# Patient Record
Sex: MALE | Race: WHITE | NOT HISPANIC OR LATINO | URBAN - METROPOLITAN AREA
[De-identification: names, ages, dates, MRNs, and addresses within clinical notes are randomized per-mention and may not be internally consistent; named-entity substitution may affect disease eponyms.]

---

## 2017-01-02 ENCOUNTER — EMERGENCY (EMERGENCY)
Facility: HOSPITAL | Age: 47
LOS: 1 days | Discharge: HOME | End: 2017-01-02
Attending: EMERGENCY MEDICINE | Admitting: EMERGENCY MEDICINE
Payer: COMMERCIAL

## 2017-01-02 VITALS
TEMPERATURE: 98 F | DIASTOLIC BLOOD PRESSURE: 99 MMHG | RESPIRATION RATE: 18 BRPM | WEIGHT: 265 LBS | SYSTOLIC BLOOD PRESSURE: 153 MMHG | OXYGEN SATURATION: 99 % | HEART RATE: 75 BPM

## 2017-01-02 VITALS
SYSTOLIC BLOOD PRESSURE: 145 MMHG | RESPIRATION RATE: 15 BRPM | OXYGEN SATURATION: 98 % | DIASTOLIC BLOOD PRESSURE: 90 MMHG | HEART RATE: 75 BPM

## 2017-01-02 DIAGNOSIS — S09.90XA UNSPECIFIED INJURY OF HEAD, INITIAL ENCOUNTER: ICD-10-CM
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
## 2017-01-02 DIAGNOSIS — R51 HEADACHE: ICD-10-CM
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
## 2017-01-02 DIAGNOSIS — Z88.0 ALLERGY STATUS TO PENICILLIN: ICD-10-CM

## 2017-01-02 PROCEDURE — 99284 EMERGENCY DEPT VISIT MOD MDM: CPT

## 2017-01-02 PROCEDURE — 70450 CT HEAD/BRAIN W/O DYE: CPT

## 2017-01-02 PROCEDURE — 99284 EMERGENCY DEPT VISIT MOD MDM: CPT | Mod: 25

## 2017-01-02 PROCEDURE — 70450 CT HEAD/BRAIN W/O DYE: CPT | Mod: 26

## 2017-01-02 RX ORDER — AZITHROMYCIN 500 MG/1
1 TABLET, FILM COATED ORAL
Qty: 6 | Refills: 0 | OUTPATIENT
Start: 2017-01-02 | End: 2017-01-07

## 2017-01-02 NOTE — ED ADULT NURSE REASSESSMENT NOTE - NS ED NURSE REASSESS COMMENT FT1
pt reavaluated and d/c  home to follow up s/p ct scan negative
pt stable ct scan done awaiting disposition

## 2017-01-02 NOTE — ED PROVIDER NOTE - OBJECTIVE STATEMENT
45 yo male c/o frontal headache, intermittent x 1 week, hit his head against car door, no LOC, no neck pain, c/o headache that has not gone away since.  Taking tylenol and excedrin without much relief.  No nausea/vomiting.  No photophobia. 45 yo male c/o frontal headache, intermittent x 1 week, hit his head against car door, no LOC, no neck pain, c/o headache that has not gone away since.  Taking tylenol and excedrin without much relief.  No nausea/vomiting.  No photophobia. PMD Dr. Dany Ahn

## 2017-01-02 NOTE — ED ADULT NURSE NOTE - OBJECTIVE STATEMENT
received pt stable ambulating c/o headache on and off s/p hitting forehead in a car door pt evaluated and denies any blurred visiion at present nd taken to ct scan

## 2018-08-02 ENCOUNTER — EMERGENCY (EMERGENCY)
Facility: HOSPITAL | Age: 48
LOS: 1 days | Discharge: HOME | End: 2018-08-02
Attending: INTERNAL MEDICINE
Payer: COMMERCIAL

## 2018-08-02 VITALS
DIASTOLIC BLOOD PRESSURE: 81 MMHG | HEIGHT: 67 IN | WEIGHT: 240.08 LBS | RESPIRATION RATE: 20 BRPM | TEMPERATURE: 98 F | HEART RATE: 74 BPM | SYSTOLIC BLOOD PRESSURE: 150 MMHG | OXYGEN SATURATION: 96 %

## 2018-08-02 PROCEDURE — 99284 EMERGENCY DEPT VISIT MOD MDM: CPT

## 2018-08-02 PROCEDURE — 99283 EMERGENCY DEPT VISIT LOW MDM: CPT

## 2018-08-02 RX ORDER — DIPHENHYDRAMINE HCL 50 MG
1 CAPSULE ORAL
Qty: 20 | Refills: 0 | OUTPATIENT
Start: 2018-08-02 | End: 2018-08-06

## 2018-08-02 RX ADMIN — Medication 100 MILLIGRAM(S): at 22:18

## 2018-08-02 NOTE — ED PROVIDER NOTE - CHPI ED SYMPTOMS NEG
no vomiting/no wheezing/no dizziness/no fever/no red streaks/no tingling/no swelling of face, tongue

## 2018-08-02 NOTE — ED ADULT NURSE NOTE - OBJECTIVE STATEMENT
Present to ER with c/o of right arm swelling s/p bee sting yesterday. Pt states he got stung yesterday and c/o of itching and pain. Site has mild redness that got worse today.

## 2018-08-02 NOTE — ED ADULT NURSE NOTE - NSIMPLEMENTINTERV_GEN_ALL_ED
Implemented All Universal Safety Interventions:  [place] to call system. Call bell, personal items and telephone within reach. Instruct patient to call for assistance. Room bathroom lighting operational. Non-slip footwear when patient is off stretcher. Physically safe environment: no spills, clutter or unnecessary equipment. Stretcher in lowest position, wheels locked, appropriate side rails in place.

## 2018-08-02 NOTE — ED PROVIDER NOTE - ATTENDING CONTRIBUTION TO CARE
47 y/o man with bee sting x 24 h now with a localized cellulitis, no fever, no D/C no streak, no nodes, Exam local cellulitis, no fb noted plan doxycycline, benadryl and warm compress f/u with PMD

## 2018-08-02 NOTE — ED PROVIDER NOTE - OBJECTIVE STATEMENT
47 y/o M presents with c/o R upper arm redness s/p bee sting yesterday. Pt states that he was stung by a bee yesterday morning to his R upper arm, had mild itching and pain. States that he had mild redness around the site of bite but got worse today, took last dose of benadryl around 5:15pm. Denies streaking, fever, chills, n/v, numbness, tingling, myalgias, difficulty breathing, discharge or other symptoms.

## 2018-08-02 NOTE — ED PROVIDER NOTE - SKIN, MLM
Skin normal color for race, warm, dry and intact. +single tiny blister noted to volar R distal humerus with surrounding erythema and inflammation, no streaking/discharge, no fluctuance noted, pulses and sensation intact, NVI

## 2018-08-02 NOTE — ED PROVIDER NOTE - PROGRESS NOTE DETAILS
Pt examined by ED attending, Dr. Parry who agreed with disposition and plan. Will d/c home on benadryl and doxycycline for cellulitis, f/u pmd

## 2018-10-20 RX ADMIN — MORPHINE SULFATE 2 MILLIGRAM(S): 50 CAPSULE, EXTENDED RELEASE ORAL at 23:30

## 2018-10-28 ENCOUNTER — TRANSCRIPTION ENCOUNTER (OUTPATIENT)
Age: 48
End: 2018-10-28

## 2018-10-28 ENCOUNTER — INPATIENT (INPATIENT)
Facility: HOSPITAL | Age: 48
LOS: 2 days | Discharge: HOME | DRG: 419 | End: 2018-10-31
Attending: SURGERY | Admitting: SURGERY
Payer: COMMERCIAL

## 2018-10-28 VITALS
OXYGEN SATURATION: 98 % | HEART RATE: 76 BPM | DIASTOLIC BLOOD PRESSURE: 82 MMHG | TEMPERATURE: 98 F | HEIGHT: 68 IN | RESPIRATION RATE: 15 BRPM | SYSTOLIC BLOOD PRESSURE: 159 MMHG | WEIGHT: 255.96 LBS

## 2018-10-28 DIAGNOSIS — R10.9 UNSPECIFIED ABDOMINAL PAIN: ICD-10-CM

## 2018-10-28 DIAGNOSIS — K81.0 ACUTE CHOLECYSTITIS: ICD-10-CM

## 2018-10-28 LAB
ALBUMIN SERPL ELPH-MCNC: 3.3 G/DL — SIGNIFICANT CHANGE UP (ref 3.3–5)
ALP SERPL-CCNC: 105 U/L — SIGNIFICANT CHANGE UP (ref 40–120)
ALT FLD-CCNC: 33 U/L — SIGNIFICANT CHANGE UP (ref 12–78)
AMYLASE P1 CFR SERPL: 41 U/L — SIGNIFICANT CHANGE UP (ref 25–115)
ANION GAP SERPL CALC-SCNC: 5 MMOL/L — SIGNIFICANT CHANGE UP (ref 5–17)
APPEARANCE UR: CLEAR — SIGNIFICANT CHANGE UP
AST SERPL-CCNC: 23 U/L — SIGNIFICANT CHANGE UP (ref 15–37)
BILIRUB SERPL-MCNC: 0.9 MG/DL — SIGNIFICANT CHANGE UP (ref 0.2–1.2)
BILIRUB UR-MCNC: NEGATIVE — SIGNIFICANT CHANGE UP
BUN SERPL-MCNC: 9 MG/DL — SIGNIFICANT CHANGE UP (ref 7–23)
CALCIUM SERPL-MCNC: 8.6 MG/DL — SIGNIFICANT CHANGE UP (ref 8.5–10.1)
CHLORIDE SERPL-SCNC: 106 MMOL/L — SIGNIFICANT CHANGE UP (ref 96–108)
CO2 SERPL-SCNC: 30 MMOL/L — SIGNIFICANT CHANGE UP (ref 22–31)
COLOR SPEC: SIGNIFICANT CHANGE UP
CREAT SERPL-MCNC: 0.88 MG/DL — SIGNIFICANT CHANGE UP (ref 0.5–1.3)
DIFF PNL FLD: NEGATIVE — SIGNIFICANT CHANGE UP
GLUCOSE SERPL-MCNC: 116 MG/DL — HIGH (ref 70–99)
GLUCOSE UR QL: NEGATIVE — SIGNIFICANT CHANGE UP
HCT VFR BLD CALC: 44.7 % — SIGNIFICANT CHANGE UP (ref 39–50)
HGB BLD-MCNC: 15.3 G/DL — SIGNIFICANT CHANGE UP (ref 13–17)
KETONES UR-MCNC: NEGATIVE — SIGNIFICANT CHANGE UP
LEUKOCYTE ESTERASE UR-ACNC: NEGATIVE — SIGNIFICANT CHANGE UP
LIDOCAIN IGE QN: 232 U/L — SIGNIFICANT CHANGE UP (ref 73–393)
MCHC RBC-ENTMCNC: 28.2 PG — SIGNIFICANT CHANGE UP (ref 27–34)
MCHC RBC-ENTMCNC: 34.2 GM/DL — SIGNIFICANT CHANGE UP (ref 32–36)
MCV RBC AUTO: 82.5 FL — SIGNIFICANT CHANGE UP (ref 80–100)
NITRITE UR-MCNC: NEGATIVE — SIGNIFICANT CHANGE UP
NRBC # BLD: 0 /100 WBCS — SIGNIFICANT CHANGE UP (ref 0–0)
PH UR: 7 — SIGNIFICANT CHANGE UP (ref 5–8)
PLATELET # BLD AUTO: 205 K/UL — SIGNIFICANT CHANGE UP (ref 150–400)
POTASSIUM SERPL-MCNC: 3.9 MMOL/L — SIGNIFICANT CHANGE UP (ref 3.5–5.3)
POTASSIUM SERPL-SCNC: 3.9 MMOL/L — SIGNIFICANT CHANGE UP (ref 3.5–5.3)
PROT SERPL-MCNC: 7.2 G/DL — SIGNIFICANT CHANGE UP (ref 6–8.3)
PROT UR-MCNC: NEGATIVE — SIGNIFICANT CHANGE UP
RBC # BLD: 5.42 M/UL — SIGNIFICANT CHANGE UP (ref 4.2–5.8)
RBC # FLD: 12.9 % — SIGNIFICANT CHANGE UP (ref 10.3–14.5)
SODIUM SERPL-SCNC: 141 MMOL/L — SIGNIFICANT CHANGE UP (ref 135–145)
SP GR SPEC: 1 — LOW (ref 1.01–1.02)
UROBILINOGEN FLD QL: NEGATIVE — SIGNIFICANT CHANGE UP
WBC # BLD: 5.14 K/UL — SIGNIFICANT CHANGE UP (ref 3.8–10.5)
WBC # FLD AUTO: 5.14 K/UL — SIGNIFICANT CHANGE UP (ref 3.8–10.5)

## 2018-10-28 PROCEDURE — 93010 ELECTROCARDIOGRAM REPORT: CPT | Mod: 76

## 2018-10-28 PROCEDURE — 76705 ECHO EXAM OF ABDOMEN: CPT | Mod: 26

## 2018-10-28 PROCEDURE — 71045 X-RAY EXAM CHEST 1 VIEW: CPT | Mod: 26

## 2018-10-28 PROCEDURE — 78226 HEPATOBILIARY SYSTEM IMAGING: CPT | Mod: 26

## 2018-10-28 PROCEDURE — 99284 EMERGENCY DEPT VISIT MOD MDM: CPT

## 2018-10-28 RX ORDER — MORPHINE SULFATE 50 MG/1
2 CAPSULE, EXTENDED RELEASE ORAL ONCE
Qty: 0 | Refills: 0 | Status: DISCONTINUED | OUTPATIENT
Start: 2018-10-28 | End: 2018-10-28

## 2018-10-28 RX ORDER — CIPROFLOXACIN LACTATE 400MG/40ML
VIAL (ML) INTRAVENOUS
Qty: 0 | Refills: 0 | Status: DISCONTINUED | OUTPATIENT
Start: 2018-10-28 | End: 2018-10-29

## 2018-10-28 RX ORDER — PANTOPRAZOLE SODIUM 20 MG/1
40 TABLET, DELAYED RELEASE ORAL ONCE
Qty: 0 | Refills: 0 | Status: COMPLETED | OUTPATIENT
Start: 2018-10-28 | End: 2018-10-28

## 2018-10-28 RX ORDER — METRONIDAZOLE 500 MG
TABLET ORAL
Qty: 0 | Refills: 0 | Status: DISCONTINUED | OUTPATIENT
Start: 2018-10-28 | End: 2018-10-29

## 2018-10-28 RX ORDER — INFLUENZA VIRUS VACCINE 15; 15; 15; 15 UG/.5ML; UG/.5ML; UG/.5ML; UG/.5ML
0.5 SUSPENSION INTRAMUSCULAR ONCE
Qty: 0 | Refills: 0 | Status: DISCONTINUED | OUTPATIENT
Start: 2018-10-28 | End: 2018-10-31

## 2018-10-28 RX ORDER — SODIUM CHLORIDE 9 MG/ML
1000 INJECTION INTRAMUSCULAR; INTRAVENOUS; SUBCUTANEOUS ONCE
Qty: 0 | Refills: 0 | Status: COMPLETED | OUTPATIENT
Start: 2018-10-28 | End: 2018-10-28

## 2018-10-28 RX ORDER — CEFTRIAXONE 500 MG/1
1 INJECTION, POWDER, FOR SOLUTION INTRAMUSCULAR; INTRAVENOUS ONCE
Qty: 0 | Refills: 0 | Status: COMPLETED | OUTPATIENT
Start: 2018-10-28 | End: 2018-10-28

## 2018-10-28 RX ORDER — SODIUM CHLORIDE 9 MG/ML
1000 INJECTION, SOLUTION INTRAVENOUS
Qty: 0 | Refills: 0 | Status: DISCONTINUED | OUTPATIENT
Start: 2018-10-28 | End: 2018-10-29

## 2018-10-28 RX ORDER — MORPHINE SULFATE 50 MG/1
2 CAPSULE, EXTENDED RELEASE ORAL EVERY 4 HOURS
Qty: 0 | Refills: 0 | Status: DISCONTINUED | OUTPATIENT
Start: 2018-10-28 | End: 2018-10-29

## 2018-10-28 RX ORDER — METRONIDAZOLE 500 MG
500 TABLET ORAL ONCE
Qty: 0 | Refills: 0 | Status: COMPLETED | OUTPATIENT
Start: 2018-10-28 | End: 2018-10-28

## 2018-10-28 RX ORDER — METRONIDAZOLE 500 MG
500 TABLET ORAL EVERY 8 HOURS
Qty: 0 | Refills: 0 | Status: DISCONTINUED | OUTPATIENT
Start: 2018-10-28 | End: 2018-10-29

## 2018-10-28 RX ORDER — CIPROFLOXACIN LACTATE 400MG/40ML
400 VIAL (ML) INTRAVENOUS ONCE
Qty: 0 | Refills: 0 | Status: COMPLETED | OUTPATIENT
Start: 2018-10-28 | End: 2018-10-28

## 2018-10-28 RX ORDER — CIPROFLOXACIN LACTATE 400MG/40ML
400 VIAL (ML) INTRAVENOUS EVERY 12 HOURS
Qty: 0 | Refills: 0 | Status: DISCONTINUED | OUTPATIENT
Start: 2018-10-29 | End: 2018-10-29

## 2018-10-28 RX ADMIN — MORPHINE SULFATE 2 MILLIGRAM(S): 50 CAPSULE, EXTENDED RELEASE ORAL at 12:46

## 2018-10-28 RX ADMIN — CEFTRIAXONE 100 GRAM(S): 500 INJECTION, POWDER, FOR SOLUTION INTRAMUSCULAR; INTRAVENOUS at 09:57

## 2018-10-28 RX ADMIN — SODIUM CHLORIDE 75 MILLILITER(S): 9 INJECTION, SOLUTION INTRAVENOUS at 19:54

## 2018-10-28 RX ADMIN — PANTOPRAZOLE SODIUM 40 MILLIGRAM(S): 20 TABLET, DELAYED RELEASE ORAL at 06:48

## 2018-10-28 RX ADMIN — CEFTRIAXONE 1 GRAM(S): 500 INJECTION, POWDER, FOR SOLUTION INTRAMUSCULAR; INTRAVENOUS at 09:47

## 2018-10-28 RX ADMIN — MORPHINE SULFATE 2 MILLIGRAM(S): 50 CAPSULE, EXTENDED RELEASE ORAL at 22:40

## 2018-10-28 RX ADMIN — MORPHINE SULFATE 2 MILLIGRAM(S): 50 CAPSULE, EXTENDED RELEASE ORAL at 19:47

## 2018-10-28 RX ADMIN — SODIUM CHLORIDE 1000 MILLILITER(S): 9 INJECTION INTRAMUSCULAR; INTRAVENOUS; SUBCUTANEOUS at 07:47

## 2018-10-28 RX ADMIN — SODIUM CHLORIDE 1000 MILLILITER(S): 9 INJECTION INTRAMUSCULAR; INTRAVENOUS; SUBCUTANEOUS at 15:00

## 2018-10-28 RX ADMIN — Medication 200 MILLIGRAM(S): at 15:54

## 2018-10-28 RX ADMIN — MORPHINE SULFATE 2 MILLIGRAM(S): 50 CAPSULE, EXTENDED RELEASE ORAL at 14:55

## 2018-10-28 RX ADMIN — SODIUM CHLORIDE 1000 MILLILITER(S): 9 INJECTION INTRAMUSCULAR; INTRAVENOUS; SUBCUTANEOUS at 06:48

## 2018-10-28 RX ADMIN — MORPHINE SULFATE 2 MILLIGRAM(S): 50 CAPSULE, EXTENDED RELEASE ORAL at 17:04

## 2018-10-28 RX ADMIN — Medication 100 MILLIGRAM(S): at 18:25

## 2018-10-28 NOTE — ED PROVIDER NOTE - OBJECTIVE STATEMENT
47 y/o WM no PMH c/o upper abdominal pain with N/V,  intermittent episodes of pain during the past week following meals. No CP, no SOB, no fever, no chills no urinary symptoms, no GIB

## 2018-10-28 NOTE — ED ADULT NURSE NOTE - OBJECTIVE STATEMENT
Received patient awake and alert x 4, presenting to the ED with epigastric pain along with N/V x 1 week. Patient states pain has been on and off following meals. Patient denies any CP/SOB, no fever/chills. Breathing unlabored with no acute distress noted at this time. safety and comfort measures maintained, will continue to monitor.

## 2018-10-28 NOTE — H&P ADULT - NSHPLABSRESULTS_GEN_ALL_CORE
15.3   5.14  )-----------( 205      ( 28 Oct 2018 06:36 )             44.7   10-28    141  |  106  |  9   ----------------------------<  116<H>  3.9   |  30  |  0.88    Ca    8.6      28 Oct 2018 06:36    TPro  7.2  /  Alb  3.3  /  TBili  0.9  /  DBili  x   /  AST  23  /  ALT  33  /  AlkPhos  105  10-28  < from: NM Hepatobiliary Scan w/wo Gall Bladder (10.28.18 @ 13:56) >    NDINGS: There is prompt, homogeneous uptake of radiopharmaceutical by   the hepatocytes. Activity is first seen in the bowel at about 20 minutes.   The gallbladder is not visualized at any time during the study, despite   morphine administration. There is good clearance of activity from the   liver at the end of the study.    IMPRESSION: Abnormal morphine-augmented hepatobiliary scan: acute   cholecystitis.    < end of copied text >

## 2018-10-28 NOTE — ED PROVIDER NOTE - PROGRESS NOTE DETAILS
Dr. Ann:  Pt signed out to me pending RUQ sono for epigastric and RUQ pain. C/o chest pain, ekg was done and WNL. Trop added. Dr. Ann:  Case d/w Dr Rizo, GI, rec IV abx and HIDA. Will see pt. Dr. Ann: Pt at [facility], unable to do test without IV pain meds. Pt ordered for Morphine, tech aware of concern over causing abnormal test result. Dr. Ann: Positive HIDA for acute kaylene, pt sp IV abx, TBA Dr ePdersen.

## 2018-10-28 NOTE — H&P ADULT - NSHPPHYSICALEXAM_GEN_ALL_CORE
.  VITAL SIGNS:  T(C): 36.8 (10-28-18 @ 15:02), Max: 36.8 (10-28-18 @ 15:02)  T(F): 98.3 (10-28-18 @ 15:02), Max: 98.3 (10-28-18 @ 15:02)  HR: 74 (10-28-18 @ 15:02) (74 - 78)  BP: 152/68 (10-28-18 @ 15:02) (152/68 - 160/69)  BP(mean): --  RR: 17 (10-28-18 @ 15:02) (15 - 19)  SpO2: 100% (10-28-18 @ 15:02) (98% - 100%)  Wt(kg): --    PHYSICAL EXAM:    Constitutional:  NAD, resting comfortably in bed  Head: NC/AT  Eyes: PERRL b/l  ENT: MMM  Neck: supple; no JVD or thyromegaly  Respiratory: CTA B/L   Cardiac: +S1/S2; RRR   Gastrointestinal: soft, ND, RUQ tenderness  Back: no CVA B/L  Extremities: WWP, no clubbing or cyanosis; no peripheral edema  Musculoskeletal: NROM x4;   Vascular: 2+ radial, femoral, DP/PT pulses B/L  Dermatologic: skin warm, dry and intact; no rashes, wounds, or scars  Lymphatic: no submandibular, cervical or  LAD  Neurologic: AAOx3; CNII-XII grossly intact; no focal deficits  Psychiatric: affect and characteristics of appearance, verbalizations, behaviors are appropriate

## 2018-10-28 NOTE — CONSULT NOTE ADULT - ASSESSMENT
49 y/o WM no PMH c/o upper abdominal pain with N/V,  intermittent episodes of pain during the past week following meals. gi consulted for abd pain 47 y/o WM no PMH c/o upper abdominal pain with N/V,  intermittent episodes of pain during the past week following meals. gi consulted for abd pain, possible acute kaylene

## 2018-10-28 NOTE — ED ADULT NURSE REASSESSMENT NOTE - NS ED NURSE REASSESS COMMENT FT1
Received report from Rosa SHARP pt mentating at baseline. no change in neuro status. pt denies pain at this time. pt placed in position of comfort, given warm blankets. safety maintained. will continue to monitor.

## 2018-10-28 NOTE — H&P ADULT - HISTORY OF PRESENT ILLNESS
PT is a 47 yo male with 1 week hx of abdominal pain and vomiting. PT states on 3 episodes of severe epigastric pain with vomiting. LAst episode for 12 hrs straight.  Denies any fever.chills. No previous episodes before 1 week ago,.

## 2018-10-28 NOTE — CONSULT NOTE ADULT - PROBLEM SELECTOR RECOMMENDATION 9
US noted  check hida  surgical eval  abx if acute kaylene  ppi qd, zofran prn  npo/ivf  monitor abd pain  pain control US noted  f/u hida scan  case dw surgery, will see if hida +  abx if acute kaylene  ppi qd, zofran prn  npo/ivf  monitor abd exam  pain control  dw ed attg  may need egd  will follow

## 2018-10-28 NOTE — CONSULT NOTE ADULT - SUBJECTIVE AND OBJECTIVE BOX
Chief Complaint:  Patient is a 48y old  Male who presents with a chief complaint of   No pertinent past medical history      HPI: 47 y/o WM no PMH c/o upper abdominal pain with N/V,  intermittent episodes of pain during the past week following meals. No CP, no SOB, no fever, no chills no urinary symptoms, no GIB    gi consulted for ruq pain. chart reviewed. pt seen and examined      recent vs/labs/imaging reviewed   afebrile no leukocytosis  lfts wnl  us showed gs w mild wall thickening and possible fluid  hida pending      penicillin (Hives)      morphine  - Injectable 2 milliGRAM(s) IV Push Once        FAMILY HISTORY:        Review of Systems:    General:  No wt loss, fevers, chills, night sweats, fatigue  Eyes:  Good vision, no reported pain  ENT:  No sore throat, pain, runny nose, dysphagia  CV:  No pain, palpitations, no lightheadedness  Resp:  No dyspnea, cough, tachypnea, wheezing  GI: see above  :  No pain, bleeding, incontinence, nocturia  Muscle:  No pain, weakness  Neuro:  No weakness, tingling, memory problems  Psych:  No fatigue, insomnia, mood problems, depression  Endocrine:  No polyuria, polydypsia, cold/heat intolerance  Heme:  No petechiae, ecchymosis, easy bruisability  Skin:  No rash, tattoos, scars, edema    Relevant Family History:   n/c    Relevant Social History: n/c      Physical Exam:    Vital Signs:  Vital Signs Last 24 Hrs  T(C): 36.4 (28 Oct 2018 07:53), Max: 36.4 (28 Oct 2018 06:01)  T(F): 97.6 (28 Oct 2018 07:53), Max: 97.6 (28 Oct 2018 06:01)  HR: 75 (28 Oct 2018 07:53) (75 - 76)  BP: 160/68 (28 Oct 2018 07:53) (159/82 - 160/68)  BP(mean): --  RR: 18 (28 Oct 2018 07:53) (15 - 18)  SpO2: 99% (28 Oct 2018 07:53) (98% - 99%)  Daily Height in cm: 172.72 (28 Oct 2018 06:01)    Daily     General:  Appears stated age, well-groomed, nad  HEENT:  NC/AT,  conjunctivae clear and pink, no thyromegaly, nodules, adenopathy, no JVD  Chest:  Full & symmetric excursion, no increased effort, breath sounds clear  Cardiovascular:  Regular rhythm, S1, S2, no murmur/rub/S3/S4, no abdominal bruit, no edema  Abdomen:  Soft, non-tender, non-distended, normoactive bowel sounds,  no masses ,no hepatosplenomeagaly, no signs of chronic liver disease  Extremities:  no cyanosis,clubbing or edema  Skin:  No rash/erythema/ecchymoses/petechiae/wounds/abscess/warm/dry  Neuro/Psych:  A&O  , no asterixis, no tremor, no encephalopathy    Laboratory:                            15.3   5.14  )-----------( 205      ( 28 Oct 2018 06:36 )             44.7     10-28    141  |  106  |  9   ----------------------------<  116<H>  3.9   |  30  |  0.88    Ca    8.6      28 Oct 2018 06:36    TPro  7.2  /  Alb  3.3  /  TBili  0.9  /  DBili  x   /  AST  23  /  ALT  33  /  AlkPhos  105  10-28    LIVER FUNCTIONS - ( 28 Oct 2018 06:36 )  Alb: 3.3 g/dL / Pro: 7.2 g/dL / ALK PHOS: 105 U/L / ALT: 33 U/L / AST: 23 U/L / GGT: x               Amylase Serum41      Lipase [id number]       Ammonia--    Imaging: Chief Complaint:  Patient is a 48y old  Male who presents with a chief complaint of   No pertinent past medical history      HPI: 49 y/o WM no PMH c/o upper abdominal pain with N/V,  intermittent episodes of pain during the past week following meals. No CP, no SOB, no fever, no chills no urinary symptoms, no GIB    gi consulted for ruq pain. chart reviewed. pt seen and examined. wife present. pt reports intermittent nausea, non bloody vomiting and non radiating epigastric/ruq pain over the past week, symptoms waxing and waning but worse post prandially. denies hx of pud, pancreatitis. last bm yesterday and normal. reports similar pain in remote past. +chills at home. denies fevers/melena/brbpr.  denies prior abd surgeries. no hx of egd colon. no fhx of gi cancers. meds n/c. denies toxic habits.    recent vs/labs/imaging reviewed   afebrile no leukocytosis  lfts wnl  us showed gs w mild wall thickening and possible fluid  hida pending- unable to tolerate initially, re-medicated, study to be reattempted      penicillin (Hives)      morphine  - Injectable 2 milliGRAM(s) IV Push Once        FAMILY HISTORY:        Review of Systems:    General:  No wt loss, fevers, chills, night sweats, fatigue  Eyes:  Good vision, no reported pain  ENT:  No sore throat, pain, runny nose, dysphagia  CV:  No pain, palpitations, no lightheadedness  Resp:  No dyspnea, cough, tachypnea, wheezing  GI: see above  :  No pain, bleeding, incontinence, nocturia  Muscle:  No pain, weakness  Neuro:  No weakness, tingling, memory problems  Psych:  No fatigue, insomnia, mood problems, depression  Endocrine:  No polyuria, polydypsia, cold/heat intolerance  Heme:  No petechiae, ecchymosis, easy bruisability  Skin:  No rash, tattoos, scars, edema    Relevant Family History:   n/c    Relevant Social History: n/c      Physical Exam:    Vital Signs:  Vital Signs Last 24 Hrs  T(C): 36.4 (28 Oct 2018 07:53), Max: 36.4 (28 Oct 2018 06:01)  T(F): 97.6 (28 Oct 2018 07:53), Max: 97.6 (28 Oct 2018 06:01)  HR: 75 (28 Oct 2018 07:53) (75 - 76)  BP: 160/68 (28 Oct 2018 07:53) (159/82 - 160/68)  BP(mean): --  RR: 18 (28 Oct 2018 07:53) (15 - 18)  SpO2: 99% (28 Oct 2018 07:53) (98% - 99%)  Daily Height in cm: 172.72 (28 Oct 2018 06:01)    Daily     General:  lying in bed in nad  HEENT:  NC/AT  Chest:  dec bs  Cardiovascular:  Regular rhythm, S1, S2  Abdomen:  soft ttp epigastric and ruq no guarding no rt- but pt recently medicated, nd  Extremities:  no  edema  Skin:  No rash  Neuro/Psych:  A&Ox3    Laboratory:                            15.3   5.14  )-----------( 205      ( 28 Oct 2018 06:36 )             44.7     10-28    141  |  106  |  9   ----------------------------<  116<H>  3.9   |  30  |  0.88    Ca    8.6      28 Oct 2018 06:36    TPro  7.2  /  Alb  3.3  /  TBili  0.9  /  DBili  x   /  AST  23  /  ALT  33  /  AlkPhos  105  10-28    LIVER FUNCTIONS - ( 28 Oct 2018 06:36 )  Alb: 3.3 g/dL / Pro: 7.2 g/dL / ALK PHOS: 105 U/L / ALT: 33 U/L / AST: 23 U/L / GGT: x               Amylase Serum41      Lipase [id number]       Ammonia--    Imaging:

## 2018-10-28 NOTE — ED ADULT NURSE REASSESSMENT NOTE - NS ED NURSE REASSESS COMMENT FT1
Patient received from [facility] RN lying in bed informed of the plan of care with understanding.

## 2018-10-28 NOTE — ED PROVIDER NOTE - SIGNIFICANT NEGATIVE FINDINGS
no headache, no chest pain, no SOB, no palpitations , no urinary symptoms, no GI  bleeding. no neuro changes.

## 2018-10-29 ENCOUNTER — RESULT REVIEW (OUTPATIENT)
Age: 48
End: 2018-10-29

## 2018-10-29 DIAGNOSIS — K81.0 ACUTE CHOLECYSTITIS: ICD-10-CM

## 2018-10-29 PROCEDURE — 88304 TISSUE EXAM BY PATHOLOGIST: CPT | Mod: 26

## 2018-10-29 RX ORDER — METRONIDAZOLE 500 MG
500 TABLET ORAL EVERY 8 HOURS
Qty: 0 | Refills: 0 | Status: DISCONTINUED | OUTPATIENT
Start: 2018-10-29 | End: 2018-10-31

## 2018-10-29 RX ORDER — KETOROLAC TROMETHAMINE 30 MG/ML
30 SYRINGE (ML) INJECTION ONCE
Qty: 0 | Refills: 0 | Status: DISCONTINUED | OUTPATIENT
Start: 2018-10-29 | End: 2018-10-29

## 2018-10-29 RX ORDER — ONDANSETRON 8 MG/1
4 TABLET, FILM COATED ORAL ONCE
Qty: 0 | Refills: 0 | Status: DISCONTINUED | OUTPATIENT
Start: 2018-10-29 | End: 2018-10-29

## 2018-10-29 RX ORDER — METOCLOPRAMIDE HCL 10 MG
10 TABLET ORAL ONCE
Qty: 0 | Refills: 0 | Status: DISCONTINUED | OUTPATIENT
Start: 2018-10-29 | End: 2018-10-29

## 2018-10-29 RX ORDER — OXYCODONE AND ACETAMINOPHEN 5; 325 MG/1; MG/1
2 TABLET ORAL EVERY 4 HOURS
Qty: 0 | Refills: 0 | Status: DISCONTINUED | OUTPATIENT
Start: 2018-10-29 | End: 2018-10-31

## 2018-10-29 RX ORDER — ACETAMINOPHEN 500 MG
1000 TABLET ORAL ONCE
Qty: 0 | Refills: 0 | Status: COMPLETED | OUTPATIENT
Start: 2018-10-29 | End: 2018-10-29

## 2018-10-29 RX ORDER — SODIUM CHLORIDE 9 MG/ML
1000 INJECTION, SOLUTION INTRAVENOUS
Qty: 0 | Refills: 0 | Status: DISCONTINUED | OUTPATIENT
Start: 2018-10-29 | End: 2018-10-29

## 2018-10-29 RX ORDER — SODIUM CHLORIDE 9 MG/ML
1000 INJECTION, SOLUTION INTRAVENOUS
Qty: 0 | Refills: 0 | Status: DISCONTINUED | OUTPATIENT
Start: 2018-10-29 | End: 2018-10-30

## 2018-10-29 RX ORDER — MORPHINE SULFATE 50 MG/1
4 CAPSULE, EXTENDED RELEASE ORAL EVERY 4 HOURS
Qty: 0 | Refills: 0 | Status: DISCONTINUED | OUTPATIENT
Start: 2018-10-29 | End: 2018-10-31

## 2018-10-29 RX ORDER — CIPROFLOXACIN LACTATE 400MG/40ML
400 VIAL (ML) INTRAVENOUS EVERY 12 HOURS
Qty: 0 | Refills: 0 | Status: DISCONTINUED | OUTPATIENT
Start: 2018-10-29 | End: 2018-10-31

## 2018-10-29 RX ORDER — ONDANSETRON 8 MG/1
4 TABLET, FILM COATED ORAL EVERY 6 HOURS
Qty: 0 | Refills: 0 | Status: DISCONTINUED | OUTPATIENT
Start: 2018-10-29 | End: 2018-10-31

## 2018-10-29 RX ORDER — ENOXAPARIN SODIUM 100 MG/ML
40 INJECTION SUBCUTANEOUS EVERY 24 HOURS
Qty: 0 | Refills: 0 | Status: DISCONTINUED | OUTPATIENT
Start: 2018-10-30 | End: 2018-10-31

## 2018-10-29 RX ORDER — OXYCODONE AND ACETAMINOPHEN 5; 325 MG/1; MG/1
1 TABLET ORAL EVERY 4 HOURS
Qty: 0 | Refills: 0 | Status: DISCONTINUED | OUTPATIENT
Start: 2018-10-29 | End: 2018-10-31

## 2018-10-29 RX ORDER — DIPHENHYDRAMINE HCL 50 MG
12.5 CAPSULE ORAL EVERY 4 HOURS
Qty: 0 | Refills: 0 | Status: DISCONTINUED | OUTPATIENT
Start: 2018-10-29 | End: 2018-10-31

## 2018-10-29 RX ADMIN — Medication 200 MILLIGRAM(S): at 00:03

## 2018-10-29 RX ADMIN — OXYCODONE AND ACETAMINOPHEN 2 TABLET(S): 5; 325 TABLET ORAL at 21:00

## 2018-10-29 RX ADMIN — MORPHINE SULFATE 2 MILLIGRAM(S): 50 CAPSULE, EXTENDED RELEASE ORAL at 03:23

## 2018-10-29 RX ADMIN — SODIUM CHLORIDE 75 MILLILITER(S): 9 INJECTION, SOLUTION INTRAVENOUS at 16:58

## 2018-10-29 RX ADMIN — Medication 100 MILLIGRAM(S): at 02:24

## 2018-10-29 RX ADMIN — OXYCODONE AND ACETAMINOPHEN 2 TABLET(S): 5; 325 TABLET ORAL at 20:28

## 2018-10-29 RX ADMIN — MORPHINE SULFATE 2 MILLIGRAM(S): 50 CAPSULE, EXTENDED RELEASE ORAL at 04:30

## 2018-10-29 RX ADMIN — MORPHINE SULFATE 2 MILLIGRAM(S): 50 CAPSULE, EXTENDED RELEASE ORAL at 09:03

## 2018-10-29 RX ADMIN — Medication 100 MILLIGRAM(S): at 10:59

## 2018-10-29 RX ADMIN — Medication 100 MILLIGRAM(S): at 21:39

## 2018-10-29 RX ADMIN — Medication 400 MILLIGRAM(S): at 16:57

## 2018-10-29 RX ADMIN — SODIUM CHLORIDE 75 MILLILITER(S): 9 INJECTION, SOLUTION INTRAVENOUS at 13:44

## 2018-10-29 RX ADMIN — MORPHINE SULFATE 2 MILLIGRAM(S): 50 CAPSULE, EXTENDED RELEASE ORAL at 09:20

## 2018-10-29 NOTE — BRIEF OPERATIVE NOTE - OPERATION/FINDINGS
Marked pericholecystic edema and fatty deposition.  Thickened gallbladder wall. Marked pericholecystic edema and fatty deposition.  Thickened gallbladder wall.  Stones within gallbladder.

## 2018-10-29 NOTE — BRIEF OPERATIVE NOTE - PROCEDURE
<<-----Click on this checkbox to enter Procedure Laparoscopic cholecystectomy  10/29/2018    Active  IVONNE

## 2018-10-30 LAB
ALBUMIN SERPL ELPH-MCNC: 2.9 G/DL — LOW (ref 3.3–5)
ALP SERPL-CCNC: 69 U/L — SIGNIFICANT CHANGE UP (ref 40–120)
ALT FLD-CCNC: 68 U/L — SIGNIFICANT CHANGE UP (ref 12–78)
ANION GAP SERPL CALC-SCNC: 7 MMOL/L — SIGNIFICANT CHANGE UP (ref 5–17)
AST SERPL-CCNC: 68 U/L — HIGH (ref 15–37)
BASOPHILS # BLD AUTO: 0.03 K/UL — SIGNIFICANT CHANGE UP (ref 0–0.2)
BASOPHILS NFR BLD AUTO: 0.3 % — SIGNIFICANT CHANGE UP (ref 0–2)
BILIRUB SERPL-MCNC: 1.2 MG/DL — SIGNIFICANT CHANGE UP (ref 0.2–1.2)
BUN SERPL-MCNC: 9 MG/DL — SIGNIFICANT CHANGE UP (ref 7–23)
CALCIUM SERPL-MCNC: 8.7 MG/DL — SIGNIFICANT CHANGE UP (ref 8.5–10.1)
CHLORIDE SERPL-SCNC: 105 MMOL/L — SIGNIFICANT CHANGE UP (ref 96–108)
CO2 SERPL-SCNC: 29 MMOL/L — SIGNIFICANT CHANGE UP (ref 22–31)
CREAT SERPL-MCNC: 0.79 MG/DL — SIGNIFICANT CHANGE UP (ref 0.5–1.3)
EOSINOPHIL # BLD AUTO: 0.02 K/UL — SIGNIFICANT CHANGE UP (ref 0–0.5)
EOSINOPHIL NFR BLD AUTO: 0.2 % — SIGNIFICANT CHANGE UP (ref 0–6)
GLUCOSE SERPL-MCNC: 104 MG/DL — HIGH (ref 70–99)
HCT VFR BLD CALC: 45.1 % — SIGNIFICANT CHANGE UP (ref 39–50)
HGB BLD-MCNC: 15.1 G/DL — SIGNIFICANT CHANGE UP (ref 13–17)
IMM GRANULOCYTES NFR BLD AUTO: 0.8 % — SIGNIFICANT CHANGE UP (ref 0–1.5)
LYMPHOCYTES # BLD AUTO: 1.89 K/UL — SIGNIFICANT CHANGE UP (ref 1–3.3)
LYMPHOCYTES # BLD AUTO: 15.9 % — SIGNIFICANT CHANGE UP (ref 13–44)
MCHC RBC-ENTMCNC: 27.7 PG — SIGNIFICANT CHANGE UP (ref 27–34)
MCHC RBC-ENTMCNC: 33.5 GM/DL — SIGNIFICANT CHANGE UP (ref 32–36)
MCV RBC AUTO: 82.6 FL — SIGNIFICANT CHANGE UP (ref 80–100)
MONOCYTES # BLD AUTO: 1.49 K/UL — HIGH (ref 0–0.9)
MONOCYTES NFR BLD AUTO: 12.5 % — SIGNIFICANT CHANGE UP (ref 2–14)
NEUTROPHILS # BLD AUTO: 8.39 K/UL — HIGH (ref 1.8–7.4)
NEUTROPHILS NFR BLD AUTO: 70.3 % — SIGNIFICANT CHANGE UP (ref 43–77)
PLATELET # BLD AUTO: 246 K/UL — SIGNIFICANT CHANGE UP (ref 150–400)
POTASSIUM SERPL-MCNC: 3.8 MMOL/L — SIGNIFICANT CHANGE UP (ref 3.5–5.3)
POTASSIUM SERPL-SCNC: 3.8 MMOL/L — SIGNIFICANT CHANGE UP (ref 3.5–5.3)
PROT SERPL-MCNC: 6.9 G/DL — SIGNIFICANT CHANGE UP (ref 6–8.3)
RBC # BLD: 5.46 M/UL — SIGNIFICANT CHANGE UP (ref 4.2–5.8)
RBC # FLD: 13 % — SIGNIFICANT CHANGE UP (ref 10.3–14.5)
SODIUM SERPL-SCNC: 141 MMOL/L — SIGNIFICANT CHANGE UP (ref 135–145)
WBC # BLD: 11.92 K/UL — HIGH (ref 3.8–10.5)
WBC # FLD AUTO: 11.92 K/UL — HIGH (ref 3.8–10.5)

## 2018-10-30 RX ADMIN — ENOXAPARIN SODIUM 40 MILLIGRAM(S): 100 INJECTION SUBCUTANEOUS at 12:07

## 2018-10-30 RX ADMIN — OXYCODONE AND ACETAMINOPHEN 2 TABLET(S): 5; 325 TABLET ORAL at 15:21

## 2018-10-30 RX ADMIN — Medication 200 MILLIGRAM(S): at 12:07

## 2018-10-30 RX ADMIN — Medication 200 MILLIGRAM(S): at 23:56

## 2018-10-30 RX ADMIN — OXYCODONE AND ACETAMINOPHEN 2 TABLET(S): 5; 325 TABLET ORAL at 05:18

## 2018-10-30 RX ADMIN — Medication 100 MILLIGRAM(S): at 20:50

## 2018-10-30 RX ADMIN — Medication 100 MILLIGRAM(S): at 06:18

## 2018-10-30 RX ADMIN — OXYCODONE AND ACETAMINOPHEN 2 TABLET(S): 5; 325 TABLET ORAL at 23:57

## 2018-10-30 RX ADMIN — OXYCODONE AND ACETAMINOPHEN 2 TABLET(S): 5; 325 TABLET ORAL at 04:25

## 2018-10-30 RX ADMIN — Medication 200 MILLIGRAM(S): at 00:14

## 2018-10-30 RX ADMIN — OXYCODONE AND ACETAMINOPHEN 2 TABLET(S): 5; 325 TABLET ORAL at 18:15

## 2018-10-30 RX ADMIN — Medication 100 MILLIGRAM(S): at 15:17

## 2018-10-30 RX ADMIN — OXYCODONE AND ACETAMINOPHEN 2 TABLET(S): 5; 325 TABLET ORAL at 17:28

## 2018-10-30 RX ADMIN — OXYCODONE AND ACETAMINOPHEN 2 TABLET(S): 5; 325 TABLET ORAL at 12:14

## 2018-10-30 NOTE — PROGRESS NOTE ADULT - PROBLEM SELECTOR PLAN 1
sp OR for lap kaylene  cont antibiotics  iv fluids/diet as per surgery  pain control   monitor abd exam   will follow

## 2018-10-31 ENCOUNTER — TRANSCRIPTION ENCOUNTER (OUTPATIENT)
Age: 48
End: 2018-10-31

## 2018-10-31 VITALS
TEMPERATURE: 99 F | DIASTOLIC BLOOD PRESSURE: 73 MMHG | RESPIRATION RATE: 18 BRPM | OXYGEN SATURATION: 93 % | SYSTOLIC BLOOD PRESSURE: 125 MMHG | HEART RATE: 87 BPM

## 2018-10-31 LAB
HCT VFR BLD CALC: 42.9 % — SIGNIFICANT CHANGE UP (ref 39–50)
HGB BLD-MCNC: 14.3 G/DL — SIGNIFICANT CHANGE UP (ref 13–17)
MCHC RBC-ENTMCNC: 27.9 PG — SIGNIFICANT CHANGE UP (ref 27–34)
MCHC RBC-ENTMCNC: 33.3 GM/DL — SIGNIFICANT CHANGE UP (ref 32–36)
MCV RBC AUTO: 83.8 FL — SIGNIFICANT CHANGE UP (ref 80–100)
NRBC # BLD: 0 /100 WBCS — SIGNIFICANT CHANGE UP (ref 0–0)
PLATELET # BLD AUTO: 226 K/UL — SIGNIFICANT CHANGE UP (ref 150–400)
RBC # BLD: 5.12 M/UL — SIGNIFICANT CHANGE UP (ref 4.2–5.8)
RBC # FLD: 13.2 % — SIGNIFICANT CHANGE UP (ref 10.3–14.5)
WBC # BLD: 11.84 K/UL — HIGH (ref 3.8–10.5)
WBC # FLD AUTO: 11.84 K/UL — HIGH (ref 3.8–10.5)

## 2018-10-31 PROCEDURE — 80076 HEPATIC FUNCTION PANEL: CPT

## 2018-10-31 PROCEDURE — 96375 TX/PRO/DX INJ NEW DRUG ADDON: CPT

## 2018-10-31 PROCEDURE — 76705 ECHO EXAM OF ABDOMEN: CPT

## 2018-10-31 PROCEDURE — 96374 THER/PROPH/DIAG INJ IV PUSH: CPT

## 2018-10-31 PROCEDURE — A9537: CPT

## 2018-10-31 PROCEDURE — 36415 COLL VENOUS BLD VENIPUNCTURE: CPT

## 2018-10-31 PROCEDURE — 82150 ASSAY OF AMYLASE: CPT

## 2018-10-31 PROCEDURE — 83690 ASSAY OF LIPASE: CPT

## 2018-10-31 PROCEDURE — 85027 COMPLETE CBC AUTOMATED: CPT

## 2018-10-31 PROCEDURE — 88304 TISSUE EXAM BY PATHOLOGIST: CPT

## 2018-10-31 PROCEDURE — 80053 COMPREHEN METABOLIC PANEL: CPT

## 2018-10-31 PROCEDURE — 86900 BLOOD TYPING SEROLOGIC ABO: CPT

## 2018-10-31 PROCEDURE — 86850 RBC ANTIBODY SCREEN: CPT

## 2018-10-31 PROCEDURE — C1889: CPT

## 2018-10-31 PROCEDURE — 93005 ELECTROCARDIOGRAM TRACING: CPT

## 2018-10-31 PROCEDURE — 71045 X-RAY EXAM CHEST 1 VIEW: CPT

## 2018-10-31 PROCEDURE — 78226 HEPATOBILIARY SYSTEM IMAGING: CPT

## 2018-10-31 PROCEDURE — 81003 URINALYSIS AUTO W/O SCOPE: CPT

## 2018-10-31 PROCEDURE — 84484 ASSAY OF TROPONIN QUANT: CPT

## 2018-10-31 PROCEDURE — 86901 BLOOD TYPING SEROLOGIC RH(D): CPT

## 2018-10-31 PROCEDURE — 99285 EMERGENCY DEPT VISIT HI MDM: CPT | Mod: 25

## 2018-10-31 PROCEDURE — 82248 BILIRUBIN DIRECT: CPT

## 2018-10-31 RX ORDER — L.ACIDOPH/B.ANIMALIS/B.LONGUM 15B CELL
1 CAPSULE ORAL
Qty: 0 | Refills: 0 | COMMUNITY

## 2018-10-31 RX ORDER — CIPROFLOXACIN LACTATE 400MG/40ML
1 VIAL (ML) INTRAVENOUS
Qty: 10 | Refills: 0 | OUTPATIENT
Start: 2018-10-31 | End: 2018-11-04

## 2018-10-31 RX ORDER — METRONIDAZOLE 500 MG
1 TABLET ORAL
Qty: 15 | Refills: 0 | OUTPATIENT
Start: 2018-10-31 | End: 2018-11-04

## 2018-10-31 RX ADMIN — OXYCODONE AND ACETAMINOPHEN 2 TABLET(S): 5; 325 TABLET ORAL at 00:57

## 2018-10-31 RX ADMIN — OXYCODONE AND ACETAMINOPHEN 2 TABLET(S): 5; 325 TABLET ORAL at 12:08

## 2018-10-31 RX ADMIN — ENOXAPARIN SODIUM 40 MILLIGRAM(S): 100 INJECTION SUBCUTANEOUS at 11:06

## 2018-10-31 RX ADMIN — OXYCODONE AND ACETAMINOPHEN 2 TABLET(S): 5; 325 TABLET ORAL at 07:03

## 2018-10-31 RX ADMIN — OXYCODONE AND ACETAMINOPHEN 2 TABLET(S): 5; 325 TABLET ORAL at 07:44

## 2018-10-31 RX ADMIN — Medication 200 MILLIGRAM(S): at 11:05

## 2018-10-31 RX ADMIN — Medication 100 MILLIGRAM(S): at 05:09

## 2018-10-31 NOTE — DISCHARGE NOTE ADULT - PLAN OF CARE
Wound healing, recovery from surgery 1) Take Antibiotics and complete course as directed  2) Take pain medication as needed for over the counter motrin 600mg every 6-8 hrs as needed  3) Follow up with Dr. Pedersen in the office in 1 week

## 2018-10-31 NOTE — DISCHARGE NOTE ADULT - PATIENT PORTAL LINK FT
You can access the [facility] Patient Portal, offered by [facility], by registering with the following website: [email or web address]

## 2018-10-31 NOTE — DISCHARGE NOTE ADULT - CARE PROVIDER_API CALL
Devyn Pedersen (MD), Surgery  [address] [facility]  [address] [place] [address]  Phone: [phone]  Fax: [phone]

## 2018-10-31 NOTE — DISCHARGE NOTE ADULT - CARE PLAN
Principal Discharge DX:	Acute cholecystitis  Goal:	Wound healing, recovery from surgery  Assessment and plan of treatment:	1) Take Antibiotics and complete course as directed  2) Take pain medication as needed for over the counter motrin 600mg every 6-8 hrs as needed  3) Follow up with Dr. Pedersen in the office in 1 week

## 2018-10-31 NOTE — PROGRESS NOTE ADULT - SUBJECTIVE AND OBJECTIVE BOX
pt seen  feeling good  minimal discomfort  ICU Vital Signs Last 24 Hrs  T(C): 36.7 (30 Oct 2018 07:20), Max: 37 (29 Oct 2018 16:19)  T(F): 98 (30 Oct 2018 07:20), Max: 98.6 (29 Oct 2018 16:19)  HR: 87 (30 Oct 2018 07:20) (80 - 96)  BP: 134/82 (30 Oct 2018 07:20) (112/72 - 154/94)  BP(mean): --  ABP: --  ABP(mean): --  RR: 16 (30 Oct 2018 07:20) (14 - 24)  SpO2: 94% (30 Oct 2018 07:20) (94% - 99%)  gen-NAD  resp-clear  abd-soft Nt/ND  incsiion c/d/i                          15.1   11.92 )-----------( 246      ( 30 Oct 2018 06:36 )             45.1
INTERVAL HPI/OVERNIGHT EVENTS:  pt seen and examined  denies n/v/abd pain, oob walking around room  toelrating diet  sp OR yesterday  afebrile overnight labs noted    MEDICATIONS  (STANDING):  ciprofloxacin   IVPB 400 milliGRAM(s) IV Intermittent every 12 hours  enoxaparin Injectable 40 milliGRAM(s) SubCutaneous every 24 hours  influenza   Vaccine 0.5 milliLiter(s) IntraMuscular once  lactated ringers. 1000 milliLiter(s) (75 mL/Hr) IV Continuous <Continuous>  metroNIDAZOLE  IVPB 500 milliGRAM(s) IV Intermittent every 8 hours    MEDICATIONS  (PRN):  diphenhydrAMINE   Injectable 12.5 milliGRAM(s) IV Push every 4 hours PRN Itching  morphine  - Injectable 4 milliGRAM(s) IV Push every 4 hours PRN Severe Pain (7 - 10)  ondansetron Injectable 4 milliGRAM(s) IV Push every 6 hours PRN Nausea  oxyCODONE    5 mG/acetaminophen 325 mG 1 Tablet(s) Oral every 4 hours PRN Mild Pain (1 - 3)  oxyCODONE    5 mG/acetaminophen 325 mG 2 Tablet(s) Oral every 4 hours PRN Moderate Pain (4 - 6)      Allergies    penicillin (Hives)    Intolerances        Review of Systems:    General:  No wt loss, fevers, chills, night sweats, fatigue   Eyes:  Good vision, no reported pain  ENT:  No sore throat, pain, runny nose, dysphagia  CV:  No pain, palpitations, hypo/hypertension  Resp:  No dyspnea, cough, tachypnea, wheezing  GI:  No pain, No nausea, No vomiting, No diarrhea, No constipation, No weight loss, No fever, No pruritis, No rectal bleeding, No melena, No dysphagia  :  No pain, bleeding, incontinence, nocturia  Muscle:  No pain, weakness  Neuro:  No weakness, tingling, memory problems  Psych:  No fatigue, insomnia, mood problems, depression  Endocrine:  No polyuria, polydypsia, cold/heat intolerance  Heme:  No petechiae, ecchymosis, easy bruisability  Skin:  No rash, tattoos, scars, edema      Vital Signs Last 24 Hrs  T(C): 36.7 (30 Oct 2018 07:20), Max: 37 (29 Oct 2018 16:19)  T(F): 98 (30 Oct 2018 07:20), Max: 98.6 (29 Oct 2018 16:19)  HR: 87 (30 Oct 2018 07:20) (80 - 96)  BP: 134/82 (30 Oct 2018 07:20) (112/72 - 154/94)  BP(mean): --  RR: 16 (30 Oct 2018 07:20) (14 - 24)  SpO2: 94% (30 Oct 2018 07:20) (94% - 99%)    PHYSICAL EXAM:    Constitutional: NAD, walking around room  HEENT: ncat  Neck: No LAD,  Respiratory: CTA   Cardiovascular: S1 and S2, RRR  Gastrointestinal: soft minimal incisional ttp nd incision sites c/d/i  Extremities: No peripheral edema  Vascular: 2+ peripheral pulses  Neurological: A/O x 3  Skin: No rashes      LABS:                        15.1   11.92 )-----------( 246      ( 30 Oct 2018 06:36 )             45.1     10-30    141  |  105  |  9   ----------------------------<  104<H>  3.8   |  29  |  0.79    Ca    8.7      30 Oct 2018 06:36    TPro  6.9  /  Alb  2.9<L>  /  TBili  1.2  /  DBili  x   /  AST  68<H>  /  ALT  68  /  AlkPhos  69  10-30      Urinalysis Basic - ( 28 Oct 2018 22:09 )    Color: Pale Yellow / Appearance: Clear / S.005 / pH: x  Gluc: x / Ketone: Negative  / Bili: Negative / Urobili: Negative   Blood: x / Protein: Negative / Nitrite: Negative   Leuk Esterase: Negative / RBC: x / WBC x   Sq Epi: x / Non Sq Epi: x / Bacteria: x        RADIOLOGY & ADDITIONAL TESTS:
INTERVAL HPI/OVERNIGHT EVENTS:  pt seen and examined  states he feels well  denies  n/v/abd pain, passing gas, toelrating diet  afebrile overnight labs noted    MEDICATIONS  (STANDING):  ciprofloxacin   IVPB 400 milliGRAM(s) IV Intermittent every 12 hours  enoxaparin Injectable 40 milliGRAM(s) SubCutaneous every 24 hours  influenza   Vaccine 0.5 milliLiter(s) IntraMuscular once  metroNIDAZOLE  IVPB 500 milliGRAM(s) IV Intermittent every 8 hours    MEDICATIONS  (PRN):  diphenhydrAMINE   Injectable 12.5 milliGRAM(s) IV Push every 4 hours PRN Itching  morphine  - Injectable 4 milliGRAM(s) IV Push every 4 hours PRN Severe Pain (7 - 10)  ondansetron Injectable 4 milliGRAM(s) IV Push every 6 hours PRN Nausea  oxyCODONE    5 mG/acetaminophen 325 mG 1 Tablet(s) Oral every 4 hours PRN Mild Pain (1 - 3)  oxyCODONE    5 mG/acetaminophen 325 mG 2 Tablet(s) Oral every 4 hours PRN Moderate Pain (4 - 6)      Allergies    penicillin (Hives)    Intolerances        Review of Systems:    General:  No wt loss, fevers, chills, night sweats, fatigue   Eyes:  Good vision, no reported pain  ENT:  No sore throat, pain, runny nose, dysphagia  CV:  No pain, palpitations, hypo/hypertension  Resp:  No dyspnea, cough, tachypnea, wheezing  GI:  No pain, No nausea, No vomiting, No diarrhea, No constipation, No weight loss, No fever, No pruritis, No rectal bleeding, No melena, No dysphagia  :  No pain, bleeding, incontinence, nocturia  Muscle:  No pain, weakness  Neuro:  No weakness, tingling, memory problems  Psych:  No fatigue, insomnia, mood problems, depression  Endocrine:  No polyuria, polydypsia, cold/heat intolerance  Heme:  No petechiae, ecchymosis, easy bruisability  Skin:  No rash, tattoos, scars, edema      Vital Signs Last 24 Hrs  T(C): 37.3 (31 Oct 2018 07:08), Max: 37.3 (31 Oct 2018 07:08)  T(F): 99.1 (31 Oct 2018 07:08), Max: 99.1 (31 Oct 2018 07:08)  HR: 87 (31 Oct 2018 07:08) (87 - 112)  BP: 125/73 (31 Oct 2018 07:08) (123/75 - 130/77)  BP(mean): --  RR: 18 (31 Oct 2018 07:08) (16 - 18)  SpO2: 93% (31 Oct 2018 07:08) (90% - 95%)    PHYSICAL EXAM:    Constitutional: NAD, lying in bed  HEENT: ncat  Neck: No LAD,  Respiratory: CTA   Cardiovascular: S1 and S2, RRR  Gastrointestinal: soft minimal incisional ttp nd incision sites c/d/i ecchymosis inferior to umbilicus  Extremities: No peripheral edema  Vascular: 2+ peripheral pulses  Neurological: A/O x 3  Skin: No rashes    LABS:                        14.3   11.84 )-----------( 226      ( 31 Oct 2018 05:38 )             42.9     10-30    141  |  105  |  9   ----------------------------<  104<H>  3.8   |  29  |  0.79    Ca    8.7      30 Oct 2018 06:36    TPro  6.9  /  Alb  2.9<L>  /  TBili  1.2  /  DBili  x   /  AST  68<H>  /  ALT  68  /  AlkPhos  69  10-30          RADIOLOGY & ADDITIONAL TESTS:
[facility]  Christiano Mccullough PA-C  [address] [place] [address]  [phone]      INTERVAL HPI/OVERNIGHT EVENTS:    still having pain unchanged     MEDICATIONS  (STANDING):  ciprofloxacin   IVPB 400 milliGRAM(s) IV Intermittent every 12 hours  ciprofloxacin   IVPB      influenza   Vaccine 0.5 milliLiter(s) IntraMuscular once  lactated ringers. 1000 milliLiter(s) (75 mL/Hr) IV Continuous <Continuous>  metroNIDAZOLE  IVPB      metroNIDAZOLE  IVPB 500 milliGRAM(s) IV Intermittent every 8 hours    MEDICATIONS  (PRN):  morphine  - Injectable 2 milliGRAM(s) IV Push every 4 hours PRN Moderate Pain (4 - 6)      Allergies    penicillin (Hives)    Intolerances        ROS:   General:  No wt loss, fevers, chills, night sweats, fatigue,   Eyes:  Good vision, no reported pain  ENT:  No sore throat, pain, runny nose, dysphagia  CV:  No pain, palpitations, hypo/hypertension  Resp:  No dyspnea, cough, tachypnea, wheezing  GI:  + pain, No nausea, No vomiting, No diarrhea, No constipation, No weight loss, No fever, No pruritis, No rectal bleeding, No tarry stools, No dysphagia,  :  No pain, bleeding, incontinence, nocturia  Muscle:  No pain, weakness  Neuro:  No weakness, tingling, memory problems  Psych:  No fatigue, insomnia, mood problems, depression  Endocrine:  No polyuria, polydipsia, cold/heat intolerance  Heme:  No petechiae, ecchymosis, easy bruisability  Skin:  No rash, tattoos, scars, edema      PHYSICAL EXAM:   Vital Signs:  Vital Signs Last 24 Hrs  T(C): 36.9 (29 Oct 2018 07:10), Max: 37.1 (28 Oct 2018 20:39)  T(F): 98.4 (29 Oct 2018 07:10), Max: 98.8 (28 Oct 2018 20:39)  HR: 69 (29 Oct 2018 07:10) (69 - 83)  BP: 155/77 (29 Oct 2018 07:10) (128/76 - 160/82)  BP(mean): --  RR: 16 (29 Oct 2018 07:10) (16 - 19)  SpO2: 97% (29 Oct 2018 07:10) (97% - 100%)  Daily Height in cm: 172.72 (29 Oct 2018 08:33)    Daily Weight in k.8 (28 Oct 2018 20:39)    GENERAL:  Appears stated age, well-groomed, well-nourished, no distress  HEENT:  NC/AT,  conjunctivae clear and pink, no thyromegaly, nodules, adenopathy, no JVD, sclera -anicteric  CHEST:  Full & symmetric excursion, no increased effort, breath sounds clear  HEART:  Regular rhythm, S1, S2, no murmur/rub/S3/S4, no abdominal bruit, no edema  ABDOMEN:  Soft, mildly tender   EXTEREMITIES:  no cyanosis,clubbing or edema  SKIN:  No rash/erythema/ecchymoses/petechiae/wounds/abscess/warm/dry  NEURO:  Alert, oriented, no asterixis, no tremor, no encephalopathy      LABS:                        15.3   5.14  )-----------( 205      ( 28 Oct 2018 06:36 )             44.7     10-    141  |  106  |  9   ----------------------------<  116<H>  3.9   |  30  |  0.88    Ca    8.6      28 Oct 2018 06:36    TPro  7.2  /  Alb  3.3  /  TBili  0.9  /  DBili  x   /  AST  23  /  ALT  33  /  AlkPhos  105  10-28      Urinalysis Basic - ( 28 Oct 2018 22:09 )    Color: Pale Yellow / Appearance: Clear / S.005 / pH: x  Gluc: x / Ketone: Negative  / Bili: Negative / Urobili: Negative   Blood: x / Protein: Negative / Nitrite: Negative   Leuk Esterase: Negative / RBC: x / WBC x   Sq Epi: x / Non Sq Epi: x / Bacteria: x        RADIOLOGY & ADDITIONAL TESTS:
The patient was interviewed and evaluated.    48y Male    T(C): 36.7 (10-30-18 @ 07:20), Max: 37 (10-29-18 @ 16:19)  HR: 87 (10-30-18 @ 07:20) (80 - 96)  BP: 134/82 (10-30-18 @ 07:20) (112/72 - 154/94)  RR: 16 (10-30-18 @ 07:20) (14 - 24)  SpO2: 94% (10-30-18 @ 07:20) (94% - 99%)  Wt(kg): --    No Nausea/vomiting, recall, sore throat or headache.    No anesthesia related complaints or sequelae.
pt seen  feeling good  no complaints  ICU Vital Signs Last 24 Hrs  T(C): 37.3 (31 Oct 2018 07:08), Max: 37.3 (31 Oct 2018 07:08)  T(F): 99.1 (31 Oct 2018 07:08), Max: 99.1 (31 Oct 2018 07:08)  HR: 87 (31 Oct 2018 07:08) (87 - 112)  BP: 125/73 (31 Oct 2018 07:08) (123/75 - 130/77)  BP(mean): --  ABP: --  ABP(mean): --  RR: 18 (31 Oct 2018 07:08) (16 - 18)  SpO2: 93% (31 Oct 2018 07:08) (90% - 95%)  gen-NAD  resp-clear  abd-soft Nt/ND  incision c/d/i

## 2018-10-31 NOTE — DISCHARGE NOTE ADULT - NS AS ACTIVITY OBS
Do not lift more than 20 lbs for 8 weeks/Stairs allowed/No Heavy lifting/straining/Showering allowed/Do not make important decisions/Do not drive or operate machinery/Walking-Indoors allowed/Walking-Outdoors allowed

## 2018-10-31 NOTE — DISCHARGE NOTE ADULT - MEDICATION SUMMARY - MEDICATIONS TO TAKE
I will START or STAY ON the medications listed below when I get home from the hospital:    Flagyl 500 mg oral tablet  -- 1 tab(s) by mouth every 8 hours (3 times a day with food)  -- Do not drink alcoholic beverages when taking this medication.  Finish all this medication unless otherwise directed by prescriber.  May discolor urine or feces.    -- Indication: For Antibiotic    Percocet 5/325 oral tablet  -- 1-2 tab(s) by mouth every 4-6 hours as needed for pain. MDD:6  -- Caution federal law prohibits the transfer of this drug to any person other  than the person for whom it was prescribed.  May cause drowsiness.  Alcohol may intensify this effect.  Use care when operating dangerous machinery.  This prescription cannot be refilled.  This product contains acetaminophen.  Do not use  with any other product containing acetaminophen to prevent possible liver damage.  Using more of this medication than prescribed may cause serious breathing problems.    -- Indication: For Pain medication    ciprofloxacin 500 mg oral tablet  -- 1 tab(s) by mouth 2 times a day   -- Avoid prolonged or excessive exposure to direct and/or artificial sunlight while taking this medication.  Check with your doctor before becoming pregnant.  Do not take dairy products, antacids, or iron preparations within one hour of this medication.  Finish all this medication unless otherwise directed by prescriber.  Medication should be taken with plenty of water.    -- Indication: For Antibiotic

## 2018-10-31 NOTE — DISCHARGE NOTE ADULT - HOSPITAL COURSE
Pt is a 48 Y.O. M admitted to abdominal pain x 1 week with vomiting. PT states on 3 episodes of severe epigastric pain with vomiting. Pt was found to have Cholelithiasis with mild gallbladder wall thickening and possible trace   pericholecystic fluid. Pt underwent HIDA scan which was (+) with non-visualization of the gallbladder. Pt underwent Laparoscopic Cholecystectomy without incident. On POD#1, pt had adequate analgesia. Pt received DVT prophylaxis with Lovenox and early ambulation. Pt received angella-operative Cipro and Flagyl throughout hospital stay. Pt was clinically stable for discharge on POD#2 with continuation of antibiotic oral course for 5 days as outpatient.

## 2018-10-31 NOTE — PROGRESS NOTE ADULT - PROBLEM SELECTOR PLAN 1
sp OR for lap kaylene  cont antibiotics  diet as per surgery  pain control   monitor abd exam   dc planning in progress  dw surgery

## 2018-11-12 ENCOUNTER — INPATIENT (INPATIENT)
Facility: HOSPITAL | Age: 48
LOS: 1 days | Discharge: HOME | DRG: 373 | End: 2018-11-14
Attending: SURGERY | Admitting: SURGERY
Payer: COMMERCIAL

## 2018-11-12 VITALS
HEART RATE: 101 BPM | RESPIRATION RATE: 18 BRPM | HEIGHT: 68 IN | WEIGHT: 255.07 LBS | SYSTOLIC BLOOD PRESSURE: 155 MMHG | TEMPERATURE: 98 F | OXYGEN SATURATION: 98 % | DIASTOLIC BLOOD PRESSURE: 90 MMHG

## 2018-11-12 DIAGNOSIS — R11.2 NAUSEA WITH VOMITING, UNSPECIFIED: ICD-10-CM

## 2018-11-12 DIAGNOSIS — Z90.49 ACQUIRED ABSENCE OF OTHER SPECIFIED PARTS OF DIGESTIVE TRACT: Chronic | ICD-10-CM

## 2018-11-12 LAB
ALBUMIN SERPL ELPH-MCNC: 3.6 G/DL — SIGNIFICANT CHANGE UP (ref 3.3–5)
ALP SERPL-CCNC: 125 U/L — HIGH (ref 40–120)
ALT FLD-CCNC: 34 U/L — SIGNIFICANT CHANGE UP (ref 12–78)
ANION GAP SERPL CALC-SCNC: 5 MMOL/L — SIGNIFICANT CHANGE UP (ref 5–17)
AST SERPL-CCNC: 26 U/L — SIGNIFICANT CHANGE UP (ref 15–37)
BASOPHILS # BLD AUTO: 0.06 K/UL — SIGNIFICANT CHANGE UP (ref 0–0.2)
BASOPHILS NFR BLD AUTO: 0.3 % — SIGNIFICANT CHANGE UP (ref 0–2)
BILIRUB SERPL-MCNC: 0.7 MG/DL — SIGNIFICANT CHANGE UP (ref 0.2–1.2)
BUN SERPL-MCNC: 16 MG/DL — SIGNIFICANT CHANGE UP (ref 7–23)
C DIFF BY PCR RESULT: DETECTED
C DIFF TOX GENS STL QL NAA+PROBE: SIGNIFICANT CHANGE UP
CALCIUM SERPL-MCNC: 9.6 MG/DL — SIGNIFICANT CHANGE UP (ref 8.5–10.1)
CHLORIDE SERPL-SCNC: 103 MMOL/L — SIGNIFICANT CHANGE UP (ref 96–108)
CO2 SERPL-SCNC: 30 MMOL/L — SIGNIFICANT CHANGE UP (ref 22–31)
CREAT SERPL-MCNC: 1.1 MG/DL — SIGNIFICANT CHANGE UP (ref 0.5–1.3)
EOSINOPHIL # BLD AUTO: 0.11 K/UL — SIGNIFICANT CHANGE UP (ref 0–0.5)
EOSINOPHIL NFR BLD AUTO: 0.5 % — SIGNIFICANT CHANGE UP (ref 0–6)
GLUCOSE SERPL-MCNC: 134 MG/DL — HIGH (ref 70–99)
HCT VFR BLD CALC: 50.3 % — HIGH (ref 39–50)
HGB BLD-MCNC: 16.6 G/DL — SIGNIFICANT CHANGE UP (ref 13–17)
IMM GRANULOCYTES NFR BLD AUTO: 0.7 % — SIGNIFICANT CHANGE UP (ref 0–1.5)
LACTATE SERPL-SCNC: 2.2 MMOL/L — HIGH (ref 0.7–2)
LIDOCAIN IGE QN: 425 U/L — HIGH (ref 73–393)
LYMPHOCYTES # BLD AUTO: 17.7 % — SIGNIFICANT CHANGE UP (ref 13–44)
LYMPHOCYTES # BLD AUTO: 3.82 K/UL — HIGH (ref 1–3.3)
MCHC RBC-ENTMCNC: 27.7 PG — SIGNIFICANT CHANGE UP (ref 27–34)
MCHC RBC-ENTMCNC: 33 GM/DL — SIGNIFICANT CHANGE UP (ref 32–36)
MCV RBC AUTO: 83.8 FL — SIGNIFICANT CHANGE UP (ref 80–100)
MONOCYTES # BLD AUTO: 0.92 K/UL — HIGH (ref 0–0.9)
MONOCYTES NFR BLD AUTO: 4.3 % — SIGNIFICANT CHANGE UP (ref 2–14)
NEUTROPHILS # BLD AUTO: 16.5 K/UL — HIGH (ref 1.8–7.4)
NEUTROPHILS NFR BLD AUTO: 76.5 % — SIGNIFICANT CHANGE UP (ref 43–77)
NRBC # BLD: 0 /100 WBCS — SIGNIFICANT CHANGE UP (ref 0–0)
PLATELET # BLD AUTO: 442 K/UL — HIGH (ref 150–400)
POTASSIUM SERPL-MCNC: 4.3 MMOL/L — SIGNIFICANT CHANGE UP (ref 3.5–5.3)
POTASSIUM SERPL-SCNC: 4.3 MMOL/L — SIGNIFICANT CHANGE UP (ref 3.5–5.3)
PROCALCITONIN SERPL-MCNC: 0.35 NG/ML — HIGH (ref 0–0.04)
PROT SERPL-MCNC: 8.8 G/DL — HIGH (ref 6–8.3)
RBC # BLD: 6 M/UL — HIGH (ref 4.2–5.8)
RBC # FLD: 12.8 % — SIGNIFICANT CHANGE UP (ref 10.3–14.5)
SODIUM SERPL-SCNC: 138 MMOL/L — SIGNIFICANT CHANGE UP (ref 135–145)
WBC # BLD: 21.57 K/UL — HIGH (ref 3.8–10.5)
WBC # FLD AUTO: 21.57 K/UL — HIGH (ref 3.8–10.5)

## 2018-11-12 PROCEDURE — 99285 EMERGENCY DEPT VISIT HI MDM: CPT

## 2018-11-12 PROCEDURE — 47562 LAPAROSCOPIC CHOLECYSTECTOMY: CPT | Mod: AS

## 2018-11-12 PROCEDURE — 74177 CT ABD & PELVIS W/CONTRAST: CPT | Mod: 26

## 2018-11-12 PROCEDURE — 93010 ELECTROCARDIOGRAM REPORT: CPT

## 2018-11-12 RX ORDER — SODIUM CHLORIDE 9 MG/ML
1000 INJECTION, SOLUTION INTRAVENOUS
Qty: 0 | Refills: 0 | Status: DISCONTINUED | OUTPATIENT
Start: 2018-11-12 | End: 2018-11-14

## 2018-11-12 RX ORDER — SODIUM CHLORIDE 9 MG/ML
1000 INJECTION INTRAMUSCULAR; INTRAVENOUS; SUBCUTANEOUS
Qty: 0 | Refills: 0 | Status: DISCONTINUED | OUTPATIENT
Start: 2018-11-12 | End: 2018-11-13

## 2018-11-12 RX ORDER — SODIUM CHLORIDE 9 MG/ML
1000 INJECTION INTRAMUSCULAR; INTRAVENOUS; SUBCUTANEOUS ONCE
Qty: 0 | Refills: 0 | Status: COMPLETED | OUTPATIENT
Start: 2018-11-12 | End: 2018-11-12

## 2018-11-12 RX ORDER — AZTREONAM 2 G
VIAL (EA) INJECTION
Qty: 0 | Refills: 0 | Status: DISCONTINUED | OUTPATIENT
Start: 2018-11-12 | End: 2018-11-13

## 2018-11-12 RX ORDER — METRONIDAZOLE 500 MG
500 TABLET ORAL EVERY 8 HOURS
Qty: 0 | Refills: 0 | Status: DISCONTINUED | OUTPATIENT
Start: 2018-11-12 | End: 2018-11-14

## 2018-11-12 RX ORDER — ONDANSETRON 8 MG/1
4 TABLET, FILM COATED ORAL ONCE
Qty: 0 | Refills: 0 | Status: COMPLETED | OUTPATIENT
Start: 2018-11-12 | End: 2018-11-12

## 2018-11-12 RX ORDER — AZTREONAM 2 G
2000 VIAL (EA) INJECTION EVERY 8 HOURS
Qty: 0 | Refills: 0 | Status: DISCONTINUED | OUTPATIENT
Start: 2018-11-12 | End: 2018-11-13

## 2018-11-12 RX ORDER — AZTREONAM 2 G
2000 VIAL (EA) INJECTION ONCE
Qty: 0 | Refills: 0 | Status: COMPLETED | OUTPATIENT
Start: 2018-11-12 | End: 2018-11-12

## 2018-11-12 RX ORDER — SODIUM CHLORIDE 9 MG/ML
3 INJECTION INTRAMUSCULAR; INTRAVENOUS; SUBCUTANEOUS ONCE
Qty: 0 | Refills: 0 | Status: COMPLETED | OUTPATIENT
Start: 2018-11-12 | End: 2018-11-12

## 2018-11-12 RX ADMIN — SODIUM CHLORIDE 3 MILLILITER(S): 9 INJECTION INTRAMUSCULAR; INTRAVENOUS; SUBCUTANEOUS at 06:56

## 2018-11-12 RX ADMIN — SODIUM CHLORIDE 1000 MILLILITER(S): 9 INJECTION INTRAMUSCULAR; INTRAVENOUS; SUBCUTANEOUS at 09:05

## 2018-11-12 RX ADMIN — SODIUM CHLORIDE 125 MILLILITER(S): 9 INJECTION INTRAMUSCULAR; INTRAVENOUS; SUBCUTANEOUS at 12:13

## 2018-11-12 RX ADMIN — SODIUM CHLORIDE 1000 MILLILITER(S): 9 INJECTION INTRAMUSCULAR; INTRAVENOUS; SUBCUTANEOUS at 07:43

## 2018-11-12 RX ADMIN — SODIUM CHLORIDE 50 MILLILITER(S): 9 INJECTION, SOLUTION INTRAVENOUS at 22:23

## 2018-11-12 RX ADMIN — ONDANSETRON 4 MILLIGRAM(S): 8 TABLET, FILM COATED ORAL at 06:59

## 2018-11-12 RX ADMIN — SODIUM CHLORIDE 1000 MILLILITER(S): 9 INJECTION INTRAMUSCULAR; INTRAVENOUS; SUBCUTANEOUS at 06:59

## 2018-11-12 RX ADMIN — Medication 100 MILLIGRAM(S): at 23:30

## 2018-11-12 RX ADMIN — Medication 100 MILLIGRAM(S): at 22:09

## 2018-11-12 RX ADMIN — Medication 100 MILLIGRAM(S): at 12:22

## 2018-11-12 RX ADMIN — Medication 100 MILLIGRAM(S): at 14:15

## 2018-11-12 RX ADMIN — ONDANSETRON 4 MILLIGRAM(S): 8 TABLET, FILM COATED ORAL at 07:57

## 2018-11-12 NOTE — ED ADULT NURSE NOTE - CHPI ED NUR SYMPTOMS NEG
no blood in stool/no diarrhea/no fever/no abdominal distension/no burning urination/no hematuria/no dysuria/no pain

## 2018-11-12 NOTE — H&P ADULT - ASSESSMENT
47 yo s/p lap kaylene 2 weeks ago, now with leukocytosis. Had nausea/vomiting this morning that resolved. Also now with diarrhea.  CT shows small collection at GB fossa, d/w IR likely postop in nature      ID consult      abx as per ID      am labs      f/u culture

## 2018-11-12 NOTE — ED PROVIDER NOTE - OBJECTIVE STATEMENT
47yo male who presents with nausea and vomiting since 4am. pt had gallbladder removed and was doing well, last nite admitted to eating some munchkins and woke up at 4am with nausea and vomiting, no fever, chills, no abd pain no other copmalints

## 2018-11-12 NOTE — CONSULT NOTE ADULT - SUBJECTIVE AND OBJECTIVE BOX
Infectious Diseases Consult by Deborah Sandhu MD    Reason for Consult :Post op fever and leukocytosis    HPI:  49yo male who presents with nausea and vomiting since 4am. pt had lap cholecystectomy on 10/29/18 and was dc home on 10/31/18 on 7 days of Cipro and flagyl . Post op he was  doing well, except felt very tired and had no energy . he was seen by DR. Pedersen n office 1 week ago. he woke up 4 am today with nausea and vomiting and chills , He admitted to eating some munchkin late last night . he denies any abdominal pain . he also started with loose watery diarrhea since this am and has had 3 Loose BM since arrival to ER .    He had ct can of abdomen done showed post op collection in GB fossa, he has elevate WBC and lactate .    He has been afebrile in ER       Past Medical & Surgical Hx:  PAST MEDICAL & SURGICAL HISTORY:  No pertinent past medical history  S/P cholecystectomy      Social History--  works in school as    EtOH: denies   Tobacco: denies   Drug Use: denies       FAMILY HISTORY:  No pertinent family history in first degree relatives      Allergies    penicillin (Hives)    Intolerances        Home/ Out patient  Medications :  Home Medications:      Current Inpatient Medications :    ANTIBIOTICS:       OTHER RELEVANT MEDICATIONS :    ROS:  CONSTITUTIONAL:  Negative fever , Positive for chills, feels weak, poor appetite  EYES:  Negative  blurry vision or double vision  CARDIOVASCULAR:  Negative for chest pain or palpitations  RESPIRATORY:  Negative for cough, wheezing, or SOB   GASTROINTESTINAL:  Positive  for nausea, vomiting,  and diarrhea, , NO abdominal pain  GENITOURINARY:  Negative frequency, urgency , dysuria or hematuria   NEUROLOGIC:  No headache, confusion, dizziness, lightheadedness  All other systems were reviewed and are negative          I&O's Detail      Physical Exam:  Vital Signs Last 24 Hrs  T(C): 36.4 (12 Nov 2018 06:29), Max: 36.4 (12 Nov 2018 06:29)  T(F): 97.5 (12 Nov 2018 06:29), Max: 97.5 (12 Nov 2018 06:29)  HR: 91 (12 Nov 2018 10:21) (91 - 101)  BP: 134/79 (12 Nov 2018 10:21) (134/79 - 155/90)  BP(mean): --  RR: 18 (12 Nov 2018 06:29) (18 - 18)  SpO2: 98% (12 Nov 2018 10:21) (98% - 98%)  Height (cm): 172.72 (11-12 @ 06:29)  Weight (kg): 115.7 (11-12 @ 06:29)  BMI (kg/m2): 38.8 (11-12 @ 06:29)  BSA (m2): 2.27 (11-12 @ 06:29)    General: well developed well nourished, in no acute distress  Eyes: sclera anicteric, pupils equal and reactive to light  ENMT: buccal mucosa moist, pharynx not injected  Neck: supple, trachea midline  Lungs: clear, no wheeze/rhonchi  Cardiovascular: regular rate and rhythm, S1 S2  Abdomen: soft, nontender, no organomegaly present, bowel sounds normal, mild distended   Neurological:  alert and oriented x3, Cranial Nerves II-XII grossly intact  Skin:no increased ecchymosis/petechiae/purpura  Lymph Nodes: no palpable cervical/supraclavicular lymph nodes enlargements  Extremities: no cyanosis/clubbing/edema    Labs:                 16.6   21.57  )----------(  442       ( 12 Nov 2018 07:06 )               50.3      138    |  103    |  16     ----------------------------<  134        ( 12 Nov 2018 07:06 )  4.3     |  30     |  1.10     Ca    9.6        ( 12 Nov 2018 07:06 )    TPro  8.8    /  Alb  3.6    /  TBili  0.7    /  DBili  x      /  AST  26     /  ALT  34     /  AlkPhos  125    ( 12 Nov 2018 07:06 )    LIVER FUNCTIONS - ( 12 Nov 2018 07:06 )  Alb: 3.6 g/dL / Pro: 8.8 g/dL / ALK PHOS: 125 U/L / ALT: 34 U/L / AST: 26 U/L / GGT: x           Lactate, Blood: 2.2 mmol/L (11-12-18 @ 10:56)    Procalcitonin, Serum (11.12.18 @ 10:56)    Procalcitonin, Serum: 0.35:       RADIOLOGY & ADDITIONAL STUDIES:    CT Abdomen and Pelvis w/ IV Cont (11.12.18 @ 08:37) >  FINDINGS:      Patient is status post cholecystectomy, with an approximately 4.5 x 2.2   in the adjacent gallbladder fossa region anterior to the surgical clips.   No intra or extrahepatic biliary ductal dilatation is noted.    The spleen, adrenal glands and pancreas are unremarkable in appearance.    No hydroureteronephrosis is noted.    The abdominal aorta is normal in caliber.  No enlarged retroperitoneal lymphadenopathy is noted.    Evaluation of the stomach and gastrointestinal tract is limited without   distention.  Respiratory motion artifact grades image quality limiting evaluation.  There are some fluid-filled loops of small bowel and colon including a   mildly distended fluid-filled rectum. There is possible subtle polypoid   soft tissue density at the level of the anus. Recommend direct   visualization.    No high-grade bowel obstruction is noted.  No pneumoperitoneum is noted.    There is a small hiatal hernia.     The prostate gland is enlarged.  The urinary bladder is unremarkable.    Impression:    Postcholecystectomy with localized fluid collection adjacent to the   surgical clips in the gallbladder fossa. Differential considerations   include infection, including abscess or hematoma or biloma.  Finding was discussed with Dr. Solano at the time of interpretation on   11/12/2018.    Distended fluid-filled rectum with possible polypoid mucosal density at   the level of the anus. Recommend direct visualization.    Assessment :     48 year old male who is 2 weeks post op lap. cholecystectomy admitted with nausea, vomiting and diarrhea with chills started last night , he has not been feeling well sine surgery, he did complete a 7 days course of PO Abx Cipro and Flagyl, been off abx for past 1 week .    Ct scan finding suggest post op collection with a biloma or post op seroma , need to consider a bile leak .    As he is having loose watery BM need to consider C diff associated diarrhea s he was on Po abx     Plan :   - full sepsis work up with blood cs , urine and urine, lactate, procalcitonin  - send stool for C diff and stool GI PCR  - start on IV Azactam and flagyl pending cs   - if c diff negative then consider HIDA Scan to rule out bile leak   - if leukocytosis persists and Hida scan is negative may need IR drainage       Continue with present regime .  Appropriate use of antibiotics and adverse effects reviewed.      I have discussed the above plan of care with patient in detail. He expressed understanding of the treatment plan . Risks, benefits and alternatives discussed in detail. I have asked if he has any questions or concerns and appropriately addressed them to the best of my ability .      > 65 minutes spent in direct patient care reviewing  the notes, lab data/ imaging , discussion with multidisciplinary team. All questions were addressed and answered to the best of my capacity .    Thank you for allowing me to participate in the care of your patient .      Deborah Sandhu MD  [phone]

## 2018-11-12 NOTE — CONSULT NOTE ADULT - PROBLEM SELECTOR RECOMMENDATION 9
diet as tolerated  anti emetics as needed   ? gastroenteritis versus c diff  gi pcr and c diff pending  ct scan reviewed  will follow

## 2018-11-12 NOTE — ED ADULT NURSE NOTE - CHIEF COMPLAINT QUOTE
States waking up feeling very nauseous and vomiting since this morning. Vomited about 2-3 times this morning. denies any abdominal pain.

## 2018-11-12 NOTE — PATIENT PROFILE ADULT - NSPROGENSOURCEINFO_GEN_A_NUR
patient/Copy forward H&P from 11/12/2018 info verified with pt & spouse at bedside/family/health record

## 2018-11-12 NOTE — PATIENT PROFILE ADULT - VISION (WITH CORRECTIVE LENSES IF THE PATIENT USUALLY WEARS THEM):
reading glasses with pt/Normal vision: sees adequately in most situations; can see medication labels, newsprint

## 2018-11-12 NOTE — H&P ADULT - HISTORY OF PRESENT ILLNESS
47 yo female s/p lap kaylene 2 weeks ago presents with nausea/vomiting. PT was in USOH until this morning when awoke with nausea/vomiting.  Nonbloody in nature. Denying any abdominal pain.  denies fever/chills.  PT also awoke with diarrhea today with 3 episodes. No other complaints

## 2018-11-12 NOTE — CONSULT NOTE ADULT - SUBJECTIVE AND OBJECTIVE BOX
[facility]  Christiano Mccullough PA-C  [facility] [place] [address]  [phone]      Chief Complaint:  Patient is a 48y old  Male who presents with a chief complaint of nausea/vomiting (12 Nov 2018 12:17)      HPI: 48M who presents with nausea and vomiting which began day prior to admission. 2 weeks ago he was admitted with acute cholecystitis, he underwent cholecystectomy without event did well post operatively, at home tolerating diet without pain or nausea. his only complaint was some vauge weakness. He now presents with severe nausea, multiple episodes of vomiting and 5 watery loose stools.     Allergies:  penicillin (Hives)      Medications:  aztreonam  IVPB      aztreonam  IVPB 2000 milliGRAM(s) IV Intermittent every 8 hours  lactated ringers. 1000 milliLiter(s) IV Continuous <Continuous>  metroNIDAZOLE  IVPB 500 milliGRAM(s) IV Intermittent every 8 hours  sodium chloride 0.9%. 1000 milliLiter(s) IV Continuous <Continuous>      PMHX/PSHX:  No pertinent past medical history  S/P cholecystectomy  No significant past surgical history      Family history:  No pertinent family history in first degree relatives      Social History: htn    ROS:     General:  No wt loss, fevers, chills, night sweats, fatigue,   Eyes:  Good vision, no reported pain  ENT:  No sore throat, pain, runny nose, dysphagia  CV:  No pain, palpitations, hypo/hypertension  Resp:  No dyspnea, cough, tachypnea, wheezing  GI:  No pain, + nausea, + vomiting, + diarrhea, No constipation, No weight loss, No fever, No pruritis, No rectal bleeding, No tarry stools, No dysphagia,  :  No pain, bleeding, incontinence, nocturia  Muscle:  No pain, weakness  Neuro:  No weakness, tingling, memory problems  Psych:  No fatigue, insomnia, mood problems, depression  Endocrine:  No polyuria, polydipsia, cold/heat intolerance  Heme:  No petechiae, ecchymosis, easy bruisability  Skin:  No rash, tattoos, scars, edema      PHYSICAL EXAM:   Vital Signs:  Vital Signs Last 24 Hrs  T(C): 36.4 (12 Nov 2018 06:29), Max: 36.4 (12 Nov 2018 06:29)  T(F): 97.5 (12 Nov 2018 06:29), Max: 97.5 (12 Nov 2018 06:29)  HR: 91 (12 Nov 2018 10:21) (91 - 101)  BP: 134/79 (12 Nov 2018 10:21) (134/79 - 155/90)  BP(mean): --  RR: 18 (12 Nov 2018 06:29) (18 - 18)  SpO2: 98% (12 Nov 2018 10:21) (98% - 98%)  Daily Height in cm: 172.72 (12 Nov 2018 06:29)    Daily     GENERAL:  Appears stated age, well-groomed, well-nourished, no distress  HEENT:  NC/AT,  conjunctivae clear and pink, no thyromegaly, nodules, adenopathy, no JVD, sclera -anicteric  CHEST:  Full & symmetric excursion, no increased effort, breath sounds clear  HEART:  Regular rhythm, S1, S2, no murmur/rub/S3/S4, no abdominal bruit, no edema  ABDOMEN:  Soft, non-tender, non-distended, normoactive bowel sounds,  no masses ,no hepato-splenomegaly, no signs of chronic liver disease  EXTEREMITIES:  no cyanosis,clubbing or edema  SKIN:  No rash/erythema/ecchymoses/petechiae/wounds/abscess/warm/dry  NEURO:  Alert, oriented, no asterixis, no tremor, no encephalopathy    LABS:                        16.6   21.57 )-----------( 442      ( 12 Nov 2018 07:06 )             50.3     11-12    139  |  108  |  14  ----------------------------<  91  4.7   |  24  |  0.90    Ca    8.3<L>      12 Nov 2018 10:56    TPro  8.8<H>  /  Alb  3.6  /  TBili  0.7  /  DBili  x   /  AST  26  /  ALT  34  /  AlkPhos  125<H>  11-12    LIVER FUNCTIONS - ( 12 Nov 2018 07:06 )  Alb: 3.6 g/dL / Pro: 8.8 g/dL / ALK PHOS: 125 U/L / ALT: 34 U/L / AST: 26 U/L / GGT: x               Amylase Serum--      Lipase [id number]       Ammonia--      Imaging:

## 2018-11-12 NOTE — ED ADULT NURSE NOTE - OBJECTIVE STATEMENT
Patient came in to ED c/o nausea and vomiting that woke him up from sleep. Patient denies pain, fever, no burning on urination. Patient states had gallbladder taken out 2 weeks ago.

## 2018-11-12 NOTE — ED ADULT TRIAGE NOTE - CHIEF COMPLAINT QUOTE
States waking up feeling very nauseous and vomiting since this morning. Vomited about 2-3 times this morning. denies any abdominal pain. States waking up feeling very nauseous and has been vomiting since this morning. Vomited about 2-3 times this morning. denies any abdominal pain. Has his gallbladder removed 2 weeks ago.

## 2018-11-12 NOTE — H&P ADULT - NSHPLABSRESULTS_GEN_ALL_CORE
16.6   21.57 )-----------( 442      ( 12 Nov 2018 07:06 )             50.3   11-12    138  |  103  |  16  ----------------------------<  134<H>  4.3   |  30  |  1.10    Ca    9.6      12 Nov 2018 07:06    TPro  8.8<H>  /  Alb  3.6  /  TBili  0.7  /  DBili  x   /  AST  26  /  ALT  34  /  AlkPhos  125<H>  11-12  < from: CT Abdomen and Pelvis w/ IV Cont (11.12.18 @ 08:37) >    Postcholecystectomy with localized fluid collection adjacent to the   surgical clips in the gallbladder fossa. Differential considerations   include infection, including abscess or hematoma or biloma.  Finding was discussed with Dr. Solano at the time of interpretation on   11/12/2018.    < end of copied text >

## 2018-11-12 NOTE — H&P ADULT - NSHPPHYSICALEXAM_GEN_ALL_CORE
.  VITAL SIGNS:  T(C): 36.4 (11-12-18 @ 06:29), Max: 36.4 (11-12-18 @ 06:29)  T(F): 97.5 (11-12-18 @ 06:29), Max: 97.5 (11-12-18 @ 06:29)  HR: 91 (11-12-18 @ 10:21) (91 - 101)  BP: 134/79 (11-12-18 @ 10:21) (134/79 - 155/90)  BP(mean): --  RR: 18 (11-12-18 @ 06:29) (18 - 18)  SpO2: 98% (11-12-18 @ 10:21) (98% - 98%)  Wt(kg): --    PHYSICAL EXAM:    Constitutional:  NAD, resting comfortably in bed  Head: NC/AT  Eyes: PERRL b/l  ENT: MMM  Neck: supple; no JVD or thyromegaly  Respiratory: CTA B/L   Cardiac: +S1/S2; RRR   Gastrointestinal: soft, NT/ND; no rebound or guarding; + BS incision c/d/i

## 2018-11-13 DIAGNOSIS — B96.89 OTHER SPECIFIED BACTERIAL AGENTS AS THE CAUSE OF DISEASES CLASSIFIED ELSEWHERE: ICD-10-CM

## 2018-11-13 DIAGNOSIS — R93.8 ABNORMAL FINDINGS ON DIAGNOSTIC IMAGING OF OTHER SPECIFIED BODY STRUCTURES: ICD-10-CM

## 2018-11-13 LAB
ALBUMIN SERPL ELPH-MCNC: 3 G/DL — LOW (ref 3.3–5)
ALP SERPL-CCNC: 85 U/L — SIGNIFICANT CHANGE UP (ref 40–120)
ALT FLD-CCNC: 26 U/L — SIGNIFICANT CHANGE UP (ref 12–78)
ANION GAP SERPL CALC-SCNC: 7 MMOL/L — SIGNIFICANT CHANGE UP (ref 5–17)
AST SERPL-CCNC: 20 U/L — SIGNIFICANT CHANGE UP (ref 15–37)
BASOPHILS # BLD AUTO: 0.03 K/UL — SIGNIFICANT CHANGE UP (ref 0–0.2)
BASOPHILS NFR BLD AUTO: 0.5 % — SIGNIFICANT CHANGE UP (ref 0–2)
BILIRUB SERPL-MCNC: 0.8 MG/DL — SIGNIFICANT CHANGE UP (ref 0.2–1.2)
BUN SERPL-MCNC: 15 MG/DL — SIGNIFICANT CHANGE UP (ref 7–23)
CALCIUM SERPL-MCNC: 8.7 MG/DL — SIGNIFICANT CHANGE UP (ref 8.5–10.1)
CHLORIDE SERPL-SCNC: 106 MMOL/L — SIGNIFICANT CHANGE UP (ref 96–108)
CO2 SERPL-SCNC: 26 MMOL/L — SIGNIFICANT CHANGE UP (ref 22–31)
CREAT SERPL-MCNC: 0.84 MG/DL — SIGNIFICANT CHANGE UP (ref 0.5–1.3)
CULTURE RESULTS: SIGNIFICANT CHANGE UP
EOSINOPHIL # BLD AUTO: 0.22 K/UL — SIGNIFICANT CHANGE UP (ref 0–0.5)
EOSINOPHIL NFR BLD AUTO: 3.4 % — SIGNIFICANT CHANGE UP (ref 0–6)
GLUCOSE SERPL-MCNC: 88 MG/DL — SIGNIFICANT CHANGE UP (ref 70–99)
HCT VFR BLD CALC: 42.9 % — SIGNIFICANT CHANGE UP (ref 39–50)
HGB BLD-MCNC: 14 G/DL — SIGNIFICANT CHANGE UP (ref 13–17)
IMM GRANULOCYTES NFR BLD AUTO: 0.6 % — SIGNIFICANT CHANGE UP (ref 0–1.5)
LIDOCAIN IGE QN: 280 U/L — SIGNIFICANT CHANGE UP (ref 73–393)
LYMPHOCYTES # BLD AUTO: 2.21 K/UL — SIGNIFICANT CHANGE UP (ref 1–3.3)
LYMPHOCYTES # BLD AUTO: 34.5 % — SIGNIFICANT CHANGE UP (ref 13–44)
MCHC RBC-ENTMCNC: 27.5 PG — SIGNIFICANT CHANGE UP (ref 27–34)
MCHC RBC-ENTMCNC: 32.6 GM/DL — SIGNIFICANT CHANGE UP (ref 32–36)
MCV RBC AUTO: 84.1 FL — SIGNIFICANT CHANGE UP (ref 80–100)
MONOCYTES # BLD AUTO: 0.66 K/UL — SIGNIFICANT CHANGE UP (ref 0–0.9)
MONOCYTES NFR BLD AUTO: 10.3 % — SIGNIFICANT CHANGE UP (ref 2–14)
NEUTROPHILS # BLD AUTO: 3.24 K/UL — SIGNIFICANT CHANGE UP (ref 1.8–7.4)
NEUTROPHILS NFR BLD AUTO: 50.7 % — SIGNIFICANT CHANGE UP (ref 43–77)
NRBC # BLD: 0 /100 WBCS — SIGNIFICANT CHANGE UP (ref 0–0)
PLATELET # BLD AUTO: 302 K/UL — SIGNIFICANT CHANGE UP (ref 150–400)
POTASSIUM SERPL-MCNC: 3.8 MMOL/L — SIGNIFICANT CHANGE UP (ref 3.5–5.3)
POTASSIUM SERPL-SCNC: 3.8 MMOL/L — SIGNIFICANT CHANGE UP (ref 3.5–5.3)
PROT SERPL-MCNC: 7 G/DL — SIGNIFICANT CHANGE UP (ref 6–8.3)
RBC # BLD: 5.1 M/UL — SIGNIFICANT CHANGE UP (ref 4.2–5.8)
RBC # FLD: 13.1 % — SIGNIFICANT CHANGE UP (ref 10.3–14.5)
SODIUM SERPL-SCNC: 139 MMOL/L — SIGNIFICANT CHANGE UP (ref 135–145)
SPECIMEN SOURCE: SIGNIFICANT CHANGE UP
WBC # BLD: 6.4 K/UL — SIGNIFICANT CHANGE UP (ref 3.8–10.5)
WBC # FLD AUTO: 6.4 K/UL — SIGNIFICANT CHANGE UP (ref 3.8–10.5)

## 2018-11-13 RX ORDER — LACTOBACILLUS ACIDOPHILUS 100MM CELL
1 CAPSULE ORAL
Qty: 0 | Refills: 0 | Status: DISCONTINUED | OUTPATIENT
Start: 2018-11-13 | End: 2018-11-14

## 2018-11-13 RX ORDER — SIMETHICONE 80 MG/1
80 TABLET, CHEWABLE ORAL THREE TIMES A DAY
Qty: 0 | Refills: 0 | Status: DISCONTINUED | OUTPATIENT
Start: 2018-11-13 | End: 2018-11-14

## 2018-11-13 RX ORDER — VANCOMYCIN HCL 1 G
125 VIAL (EA) INTRAVENOUS EVERY 6 HOURS
Qty: 0 | Refills: 0 | Status: DISCONTINUED | OUTPATIENT
Start: 2018-11-13 | End: 2018-11-14

## 2018-11-13 RX ADMIN — Medication 100 MILLIGRAM(S): at 06:38

## 2018-11-13 RX ADMIN — SIMETHICONE 80 MILLIGRAM(S): 80 TABLET, CHEWABLE ORAL at 17:33

## 2018-11-13 RX ADMIN — Medication 100 MILLIGRAM(S): at 07:48

## 2018-11-13 RX ADMIN — Medication 1 TABLET(S): at 09:22

## 2018-11-13 RX ADMIN — Medication 125 MILLIGRAM(S): at 09:22

## 2018-11-13 RX ADMIN — Medication 100 MILLIGRAM(S): at 17:32

## 2018-11-13 RX ADMIN — Medication 1 TABLET(S): at 17:34

## 2018-11-13 RX ADMIN — SODIUM CHLORIDE 50 MILLILITER(S): 9 INJECTION, SOLUTION INTRAVENOUS at 20:04

## 2018-11-13 RX ADMIN — Medication 125 MILLIGRAM(S): at 11:29

## 2018-11-13 RX ADMIN — SIMETHICONE 80 MILLIGRAM(S): 80 TABLET, CHEWABLE ORAL at 21:47

## 2018-11-13 RX ADMIN — Medication 125 MILLIGRAM(S): at 17:35

## 2018-11-13 RX ADMIN — Medication 100 MILLIGRAM(S): at 21:47

## 2018-11-13 RX ADMIN — Medication 125 MILLIGRAM(S): at 23:35

## 2018-11-13 RX ADMIN — Medication 1 TABLET(S): at 11:32

## 2018-11-13 NOTE — PROGRESS NOTE ADULT - PROBLEM SELECTOR PLAN 1
f/u am labs  cdiff +  rec start po vanc, further abx recs as per id  f/u gi pcr  f/u cxs  bacid tid  monitor abd exam  monitor stool output  diet as tolerated  will follow f/u am labs  cdiff +  rec start po vanc  ?dc aztreonam  will defer further abx recs as per id  f/u gi pcr  f/u cxs  bacid tid  monitor abd exam  monitor stool output  diet as tolerated  above plan of care dw attg and agreeable

## 2018-11-13 NOTE — PROGRESS NOTE ADULT - PROBLEM SELECTOR PLAN 2
possible polypoid mucosal density at level of anus seen on ct  will need eventual screening colonoscopy as outpatient

## 2018-11-14 ENCOUNTER — TRANSCRIPTION ENCOUNTER (OUTPATIENT)
Age: 48
End: 2018-11-14

## 2018-11-14 VITALS
OXYGEN SATURATION: 98 % | SYSTOLIC BLOOD PRESSURE: 149 MMHG | RESPIRATION RATE: 17 BRPM | DIASTOLIC BLOOD PRESSURE: 82 MMHG | HEART RATE: 77 BPM | TEMPERATURE: 98 F

## 2018-11-14 DIAGNOSIS — R19.7 DIARRHEA, UNSPECIFIED: ICD-10-CM

## 2018-11-14 LAB
ANION GAP SERPL CALC-SCNC: 7 MMOL/L — SIGNIFICANT CHANGE UP (ref 5–17)
BASOPHILS # BLD AUTO: 0.04 K/UL — SIGNIFICANT CHANGE UP (ref 0–0.2)
BASOPHILS NFR BLD AUTO: 0.7 % — SIGNIFICANT CHANGE UP (ref 0–2)
BUN SERPL-MCNC: 12 MG/DL — SIGNIFICANT CHANGE UP (ref 7–23)
CALCIUM SERPL-MCNC: 8.4 MG/DL — LOW (ref 8.5–10.1)
CHLORIDE SERPL-SCNC: 106 MMOL/L — SIGNIFICANT CHANGE UP (ref 96–108)
CO2 SERPL-SCNC: 27 MMOL/L — SIGNIFICANT CHANGE UP (ref 22–31)
CREAT SERPL-MCNC: 0.74 MG/DL — SIGNIFICANT CHANGE UP (ref 0.5–1.3)
EOSINOPHIL # BLD AUTO: 0.19 K/UL — SIGNIFICANT CHANGE UP (ref 0–0.5)
EOSINOPHIL NFR BLD AUTO: 3.5 % — SIGNIFICANT CHANGE UP (ref 0–6)
GLUCOSE SERPL-MCNC: 79 MG/DL — SIGNIFICANT CHANGE UP (ref 70–99)
HCT VFR BLD CALC: 42 % — SIGNIFICANT CHANGE UP (ref 39–50)
HGB BLD-MCNC: 14 G/DL — SIGNIFICANT CHANGE UP (ref 13–17)
IMM GRANULOCYTES NFR BLD AUTO: 0.9 % — SIGNIFICANT CHANGE UP (ref 0–1.5)
LYMPHOCYTES # BLD AUTO: 1.94 K/UL — SIGNIFICANT CHANGE UP (ref 1–3.3)
LYMPHOCYTES # BLD AUTO: 35.3 % — SIGNIFICANT CHANGE UP (ref 13–44)
MCHC RBC-ENTMCNC: 28.1 PG — SIGNIFICANT CHANGE UP (ref 27–34)
MCHC RBC-ENTMCNC: 33.3 GM/DL — SIGNIFICANT CHANGE UP (ref 32–36)
MCV RBC AUTO: 84.3 FL — SIGNIFICANT CHANGE UP (ref 80–100)
MONOCYTES # BLD AUTO: 0.52 K/UL — SIGNIFICANT CHANGE UP (ref 0–0.9)
MONOCYTES NFR BLD AUTO: 9.5 % — SIGNIFICANT CHANGE UP (ref 2–14)
NEUTROPHILS # BLD AUTO: 2.75 K/UL — SIGNIFICANT CHANGE UP (ref 1.8–7.4)
NEUTROPHILS NFR BLD AUTO: 50.1 % — SIGNIFICANT CHANGE UP (ref 43–77)
NRBC # BLD: 0 /100 WBCS — SIGNIFICANT CHANGE UP (ref 0–0)
PLATELET # BLD AUTO: 302 K/UL — SIGNIFICANT CHANGE UP (ref 150–400)
POTASSIUM SERPL-MCNC: 4 MMOL/L — SIGNIFICANT CHANGE UP (ref 3.5–5.3)
POTASSIUM SERPL-SCNC: 4 MMOL/L — SIGNIFICANT CHANGE UP (ref 3.5–5.3)
RBC # BLD: 4.98 M/UL — SIGNIFICANT CHANGE UP (ref 4.2–5.8)
RBC # FLD: 12.5 % — SIGNIFICANT CHANGE UP (ref 10.3–14.5)
SODIUM SERPL-SCNC: 140 MMOL/L — SIGNIFICANT CHANGE UP (ref 135–145)
WBC # BLD: 5.49 K/UL — SIGNIFICANT CHANGE UP (ref 3.8–10.5)
WBC # FLD AUTO: 5.49 K/UL — SIGNIFICANT CHANGE UP (ref 3.8–10.5)

## 2018-11-14 PROCEDURE — 83605 ASSAY OF LACTIC ACID: CPT

## 2018-11-14 PROCEDURE — 96376 TX/PRO/DX INJ SAME DRUG ADON: CPT

## 2018-11-14 PROCEDURE — 87493 C DIFF AMPLIFIED PROBE: CPT

## 2018-11-14 PROCEDURE — 80053 COMPREHEN METABOLIC PANEL: CPT

## 2018-11-14 PROCEDURE — 93005 ELECTROCARDIOGRAM TRACING: CPT

## 2018-11-14 PROCEDURE — 36415 COLL VENOUS BLD VENIPUNCTURE: CPT

## 2018-11-14 PROCEDURE — 96374 THER/PROPH/DIAG INJ IV PUSH: CPT | Mod: XU

## 2018-11-14 PROCEDURE — 85027 COMPLETE CBC AUTOMATED: CPT

## 2018-11-14 PROCEDURE — 80048 BASIC METABOLIC PNL TOTAL CA: CPT

## 2018-11-14 PROCEDURE — 83690 ASSAY OF LIPASE: CPT

## 2018-11-14 PROCEDURE — 87507 IADNA-DNA/RNA PROBE TQ 12-25: CPT

## 2018-11-14 PROCEDURE — 99285 EMERGENCY DEPT VISIT HI MDM: CPT | Mod: 25

## 2018-11-14 PROCEDURE — 74177 CT ABD & PELVIS W/CONTRAST: CPT

## 2018-11-14 PROCEDURE — 87040 BLOOD CULTURE FOR BACTERIA: CPT

## 2018-11-14 PROCEDURE — 84145 PROCALCITONIN (PCT): CPT

## 2018-11-14 RX ORDER — VANCOMYCIN HCL 1 G
1 VIAL (EA) INTRAVENOUS
Qty: 40 | Refills: 0
Start: 2018-11-14 | End: 2018-11-23

## 2018-11-14 RX ORDER — LACTOBACILLUS ACIDOPHILUS 100MM CELL
0 CAPSULE ORAL
Qty: 0 | Refills: 0 | DISCHARGE
Start: 2018-11-14

## 2018-11-14 RX ADMIN — Medication 125 MILLIGRAM(S): at 05:23

## 2018-11-14 RX ADMIN — SIMETHICONE 80 MILLIGRAM(S): 80 TABLET, CHEWABLE ORAL at 13:29

## 2018-11-14 RX ADMIN — Medication 125 MILLIGRAM(S): at 12:00

## 2018-11-14 RX ADMIN — Medication 1 TABLET(S): at 08:15

## 2018-11-14 RX ADMIN — SIMETHICONE 80 MILLIGRAM(S): 80 TABLET, CHEWABLE ORAL at 05:23

## 2018-11-14 RX ADMIN — Medication 100 MILLIGRAM(S): at 05:23

## 2018-11-14 RX ADMIN — Medication 1 TABLET(S): at 12:01

## 2018-11-14 NOTE — PROGRESS NOTE ADULT - PROBLEM SELECTOR PLAN 1
clinically improving  cdiff +  also found to be +eaec  cont po vanco and iv flagyl, abx as per id  supportive care  bld cxs ngtd  ppi qd as needed  bacid tid  monitor abd exam  monitor stool output  diet as tolerated

## 2018-11-14 NOTE — PROGRESS NOTE ADULT - SUBJECTIVE AND OBJECTIVE BOX
ID progress note    Name: MILA SANDS  Age: 48y  Gender: Male  MRN: [id number]    Interval History-- Events noted, doing well, has semi formed BM , had 3 this am   Notes reviewed    Past Medical History--  No pertinent past medical history  S/P cholecystectomy  No significant past surgical history      For details regarding the patient's social history, family history, and other miscellaneous elements, please refer the initial infectious diseases consultation and/or the admitting history and physical examination for this admission.    Allergies--  Allergies    penicillin (Hives)    Intolerances        Medications--  Antibiotics:  metroNIDAZOLE  IVPB 500 milliGRAM(s) IV Intermittent every 8 hours  vancomycin    Solution 125 milliGRAM(s) Oral every 6 hours    Immunologic:    Other:  lactated ringers.  lactobacillus acidophilus  simethicone      Review of Systems--  A 10-point review of systems was obtained.     Pertinent positives and negatives--  Constitutional: No fevers. No Chills. No Rigors.   Cardiovascular: No chest pain. No palpitations.  Respiratory: No shortness of breath. No cough.  Gastrointestinal: No nausea or vomiting. No diarrhea or constipation.   Psychiatric: Pleasant. Appropriate affect.    Review of systems otherwise negative except as previously noted.    Physical Examination--  Vital Signs: T(F): 98.2 (11-14-18 @ 05:28), Max: 98.2 (11-14-18 @ 05:28)  HR: 74 (11-14-18 @ 05:28)  BP: 129/78 (11-14-18 @ 05:28)  RR: 16 (11-14-18 @ 05:28)  SpO2: 95% (11-14-18 @ 05:28)  Wt(kg): --  General: Nontoxic-appearing Male in no acute distress.  HEENT: AT/NC. PERRL. EOMI. Anicteric. Conjunctiva pink and moist. Oropharynx clear. Dentition fair.  Neck: Not rigid. No sense of mass.  Nodes: None palpable.  Lungs: Clear bilaterally without rales, wheezing or rhonchi  Heart: Regular rate and rhythm. No Murmur. No rub. No gallop. No palpable thrill.  Abdomen: Bowel sounds present and normoactive. Soft. Nondistended. Nontender. No sense of mass. No organomegaly.  Back: No spinal tenderness. No costovertebral angle tenderness.   Extremities: No cyanosis or clubbing. No edema.   Skin: Warm. Dry. Good turgor. No rash. No vasculitic stigmata.  Psychiatric: Appropriate affect and mood for situation.         Laboratory Studies--  CBC                        14.0   5.49  )-----------( 302      ( 14 Nov 2018 08:34 )             42.0       Chemistries  11-14    140  |  106  |  12  ----------------------------<  79  4.0   |  27  |  0.74    Ca    8.4<L>      14 Nov 2018 08:34    TPro  7.0  /  Alb  3.0<L>  /  TBili  0.8  /  DBili  x   /  AST  20  /  ALT  26  /  AlkPhos  85  11-13    Clostridium difficile Toxin by PCR (11.12.18 @ 15:42)      C Diff by PCR Result: Detected        RECENT CULTURES    GI PCR Panel, Stool (collected 13 Nov 2018 08:29)  Source: .Stool Feces  Final Report (13 Nov 2018 10:54):    Enteroaggregative E. coli (EAEC)    DETECTED by PCR    *******Please Note:*******    GI panel PCR evaluates for:    Campylobacter, Plesiomonas shigelloides, Salmonella,    Vibrio, Yersinia enterocolitica, Enteroaggregative    Escherichia coli (EAEC), Enteropathogenic E.coli (EPEC),    Enterotoxigenic E. coli (ETEC) lt/st, Shiga-like    toxin-producing E. coli (STEC) stx1/stx2,    Shigella/ Enteroinvasive E. coli (EIEC), Cryptosporidium,    Cyclospora cayetanensis, Entamoeba histolytica,    Giardia lamblia, Adenovirus F 40/41, Astrovirus,    Norovirus GI/GII, Rotavirus A, Sapovirus    Culture - Blood (collected 12 Nov 2018 14:44)  Source: .Blood Blood-Peripheral  Preliminary Report (13 Nov 2018 15:02):    No growth to date.    Culture - Blood (collected 12 Nov 2018 14:44)  Source: .Blood Blood-Peripheral  Preliminary Report (13 Nov 2018 15:02):    No growth to date.      RADIOLOGY:  CT Abdomen and Pelvis w/ IV Cont (11.12.18 @ 08:37) >    Impression:    Postcholecystectomy with localized fluid collection adjacent to the   surgical clips in the gallbladder fossa. Differential considerations   include infection, including abscess or hematoma or biloma.  Finding was discussed with Dr. Solano at the time of interpretation on   11/12/2018.    Distended fluid-filled rectum with possible polypoid mucosal density at   the level of the anus. Recommend direct visualization.    Assessment--    48 year old male who is 2 weeks post op lap. cholecystectomy admitted with nausea, vomiting and diarrhea with chills started last night , he has not been feeling well sine surgery, he did complete a 7 days course of PO Abx Cipro and Flagyl, been off abx for past 1 week .    Ct scan finding suggest post op collection with a biloma or post op seroma , need to consider a bile leak . but his LFT's are stable .  he still has dyspepsia     He came back positive for C diff and also Enteroaggregative E coli     Plan :   - can be dc home on Po Vancomycin 125 mg q 6 hours x 10 days   - contact precautions for C diff  - no need to treat the enteroaggregative E coli   -  keep off systemic antibiotics unless absolutely indicated   - follow up with surgery     Continue with present regime .  Appropriate use of antibiotics and adverse effects reviewed.    I have discussed the above plan of care with patient's family in detail. They expressed understanding of the treatment plan . Risks, benefits and alternatives discussed in detail. I have asked if they have any questions or concerns and appropriately addressed them to the best of my ability .      > 25 minutes spent in direct patient care reviewing  the notes, lab data/ imaging , discussion with multidisciplinary team. All questions were addressed and answered to the best of my capacity .    Thank you for allowing me to participate in the care of your patient .        Deborah Sandhu MD  [phone]
ID progress note covering Dr. Lozoya    Name: MILA SANDS  Age: 48y  Gender: Male  MRN: [id number]    Interval History--  Notes reviewed    Past Medical History--  No pertinent past medical history  S/P cholecystectomy  No significant past surgical history      For details regarding the patient's social history, family history, and other miscellaneous elements, please refer the initial infectious diseases consultation and/or the admitting history and physical examination for this admission.    Allergies--  Allergies    penicillin (Hives)    Intolerances        Medications--  Antibiotics:  aztreonam  IVPB 2000 milliGRAM(s) IV Intermittent every 8 hours  aztreonam  IVPB      metroNIDAZOLE  IVPB 500 milliGRAM(s) IV Intermittent every 8 hours    Immunologic:    Other:  lactated ringers.  lactobacillus acidophilus  simethicone      Review of Systems--  A 10-point review of systems was obtained.     Pertinent positives and negatives--  Constitutional: No fevers. No Chills. No Rigors.   Cardiovascular: No chest pain. No palpitations.  Respiratory: No shortness of breath. No cough.  Gastrointestinal: No nausea or vomiting. No diarrhea or constipation.   Psychiatric: Pleasant. Appropriate affect.    Review of systems otherwise negative except as previously noted.    Physical Examination--  Vital Signs: T(F): 97.3 (11-13-18 @ 04:35), Max: 98.2 (11-12-18 @ 21:40)  HR: 93 (11-13-18 @ 04:35)  BP: 144/88 (11-13-18 @ 04:35)  RR: 18 (11-13-18 @ 04:35)  SpO2: 95% (11-13-18 @ 04:35)  Wt(kg): --  General: Nontoxic-appearing Male in no acute distress.  HEENT: AT/NC. PERRL. EOMI. Anicteric. Conjunctiva pink and moist. Oropharynx clear. Dentition fair.  Neck: Not rigid. No sense of mass.  Nodes: None palpable.  Lungs: Clear bilaterally without rales, wheezing or rhonchi  Heart: Regular rate and rhythm. No Murmur. No rub. No gallop. No palpable thrill.  Abdomen: Bowel sounds present and normoactive. Soft. Nondistended. Nontender. No sense of mass. No organomegaly.  Back: No spinal tenderness. No costovertebral angle tenderness.   Extremities: No cyanosis or clubbing. No edema.   Skin: Warm. Dry. Good turgor. No rash. No vasculitic stigmata.  Psychiatric: Appropriate affect and mood for situation.         Laboratory Studies--  CBC                        16.6   21.57 )-----------( 442      ( 12 Nov 2018 07:06 )             50.3       Chemistries  11-12    139  |  108  |  14  ----------------------------<  91  4.7   |  24  |  0.90    Ca    8.3<L>      12 Nov 2018 10:56    TPro  8.8<H>  /  Alb  3.6  /  TBili  0.7  /  DBili  x   /  AST  26  /  ALT  34  /  AlkPhos  125<H>  11-12      Culture Data  Clostridium difficile Toxin by PCR (11.12.18 @ 15:42)      C Diff by PCR Result: Detected        RECENT CULTURES      RADIOLOGY:      Assessment--      Suggestions--    I have discussed the above plan of care with patient/family in detail. They expressed understanding of the treatment plan . Risks, benefits and alternatives discussed in detail. I have asked if they have any questions or concerns and appropriately addressed them to the best of my ability .      > 35 minutes spent in direct patient care reviewing  the notes, lab data/ imaging , discussion with multidisciplinary team. All questions were addressed and answered to the best of my capacity .    Thank you for allowing me to participate in the care of your patient .        Deborah Sandhu MD  [phone]
ID progress note     Name: MILA SANDS  Age: 48y  Gender: Male  MRN: [id number]    Interval History-- Events noted, feels better , less nauseous  , stool is more formed. His stool positive for C diff and Enteroaggregative E coli . Afebrile   Notes reviewed    Past Medical History--  No pertinent past medical history  S/P cholecystectomy  No significant past surgical history      For details regarding the patient's social history, family history, and other miscellaneous elements, please refer the initial infectious diseases consultation and/or the admitting history and physical examination for this admission.    Allergies--  Allergies    penicillin (Hives)    Intolerances        Medications--  Antibiotics:  metroNIDAZOLE  IVPB 500 milliGRAM(s) IV Intermittent every 8 hours  vancomycin    Solution 125 milliGRAM(s) Oral every 6 hours    Immunologic:    Other:  lactated ringers.  lactobacillus acidophilus  simethicone      Review of Systems--  A 10-point review of systems was obtained.     Pertinent positives and negatives--  Constitutional: No fevers. No Chills. No Rigors.   Cardiovascular: No chest pain. No palpitations.  Respiratory: No shortness of breath. No cough.  Gastrointestinal: No nausea or vomiting. No diarrhea or constipation.   Psychiatric: Pleasant. Appropriate affect.    Review of systems otherwise negative except as previously noted.    Physical Examination--  Vital Signs: T(F): 97.9 (11-13-18 @ 13:01), Max: 98.2 (11-12-18 @ 21:40)  HR: 94 (11-13-18 @ 13:01)  BP: 117/84 (11-13-18 @ 13:01)  RR: 16 (11-13-18 @ 13:01)  SpO2: 98% (11-13-18 @ 13:01)  Wt(kg): --  General: Nontoxic-appearing Male in no acute distress.  HEENT: AT/NC. PERRL. EOMI. Anicteric. Conjunctiva pink and moist. Oropharynx clear. Dentition fair.  Neck: Not rigid. No sense of mass.  Nodes: None palpable.  Lungs: Clear bilaterally without rales, wheezing or rhonchi  Heart: Regular rate and rhythm. No Murmur. No rub. No gallop. No palpable thrill.  Abdomen: Bowel sounds present and normoactive. Soft. Nondistended. Nontender. No sense of mass. No organomegaly.  Back: No spinal tenderness. No costovertebral angle tenderness.   Extremities: No cyanosis or clubbing. No edema.   Skin: Warm. Dry. Good turgor. No rash. No vasculitic stigmata.  Psychiatric: Appropriate affect and mood for situation.         Laboratory Studies--  CBC                        14.0   6.40  )-----------( 302      ( 13 Nov 2018 09:03 )             42.9       Chemistries  11-13    139  |  106  |  15  ----------------------------<  88  3.8   |  26  |  0.84    Ca    8.7      13 Nov 2018 09:03    TPro  7.0  /  Alb  3.0<L>  /  TBili  0.8  /  DBili  x   /  AST  20  /  ALT  26  /  AlkPhos  85  11-13      Clostridium difficile Toxin by PCR (11.12.18 @ 15:42)    Clostridium difficile Toxin by PCR:     C Diff by PCR Result: Detected      RECENT CULTURES    GI PCR Panel, Stool (collected 13 Nov 2018 08:29)  Source: .Stool Feces  Final Report (13 Nov 2018 10:54):    Enteroaggregative E. coli (EAEC)    DETECTED by PCR    *******Please Note:*******    GI panel PCR evaluates for:    Campylobacter, Plesiomonas shigelloides, Salmonella,    Vibrio, Yersinia enterocolitica, Enteroaggregative    Escherichia coli (EAEC), Enteropathogenic E.coli (EPEC),    Enterotoxigenic E. coli (ETEC) lt/st, Shiga-like    toxin-producing E. coli (STEC) stx1/stx2,    Shigella/ Enteroinvasive E. coli (EIEC), Cryptosporidium,    Cyclospora cayetanensis, Entamoeba histolytica,    Giardia lamblia, Adenovirus F 40/41, Astrovirus,    Norovirus GI/GII, Rotavirus A, Sapovirus    Culture - Blood (collected 12 Nov 2018 14:44)  Source: .Blood Blood-Peripheral  Preliminary Report (13 Nov 2018 15:02):    No growth to date.    Culture - Blood (collected 12 Nov 2018 14:44)  Source: .Blood Blood-Peripheral  Preliminary Report (13 Nov 2018 15:02):    No growth to date.        RADIOLOGY:  CT Abdomen and Pelvis w/ IV Cont (11.12.18 @ 08:37) >    Impression:    Postcholecystectomy with localized fluid collection adjacent to the   surgical clips in the gallbladder fossa. Differential considerations   include infection, including abscess or hematoma or biloma.  Finding was discussed with Dr. Solano at the time of interpretation on   11/12/2018.    Distended fluid-filled rectum with possible polypoid mucosal density at   the level of the anus. Recommend direct visualization.    Assessment--    48 year old male who is 2 weeks post op lap. cholecystectomy admitted with nausea, vomiting and diarrhea with chills started last night , he has not been feeling well sine surgery, he did complete a 7 days course of PO Abx Cipro and Flagyl, been off abx for past 1 week .    Ct scan finding suggest post op collection with a biloma or post op seroma , need to consider a bile leak .\, but his LFT's are stable .  he still has dyspepsia     He came back positive for C diff and also Enteroaggregative E coli     Plan :   - IV azactam stopped and added PO vancomycin to IV flagyl   - contact precautions for C diff  - no need to treat the enteroaggregative E coli   - if stable dc IV flagyl tomorrow and dc home on Po Vancomycin 125 mg q 6 hours x 10 days   - keep off systemic antibiotics unless absolutely indicated     Continue with present regime .  Appropriate use of antibiotics and adverse effects reviewed.    I have discussed the above plan of care with patient and his wife in detail. They expressed understanding of the treatment plan . Risks, benefits and alternatives discussed in detail. I have asked if they have any questions or concerns and appropriately addressed them to the best of my ability .      > 35 minutes spent in direct patient care reviewing  the notes, lab data/ imaging , discussion with multidisciplinary team. All questions were addressed and answered to the best of my capacity .    Thank you for allowing me to participate in the care of your patient .        Deborah Sandhu MD  [phone]
INTERVAL HPI/OVERNIGHT EVENTS:  pt seen and examined  feels better, stools becoming more formed, non bloody  denies n/v/abd pain  tolerating diet  afebrile overnight no new labs to assess    MEDICATIONS  (STANDING):  lactated ringers. 1000 milliLiter(s) (50 mL/Hr) IV Continuous <Continuous>  lactobacillus acidophilus 1 Tablet(s) Oral three times a day with meals  metroNIDAZOLE  IVPB 500 milliGRAM(s) IV Intermittent every 8 hours  simethicone 80 milliGRAM(s) Chew three times a day  vancomycin    Solution 125 milliGRAM(s) Oral every 6 hours    MEDICATIONS  (PRN):      Allergies    penicillin (Hives)    Intolerances        Review of Systems:    General:  No wt loss, fevers, chills, night sweats, fatigue   Eyes:  Good vision, no reported pain  ENT:  No sore throat, pain, runny nose, dysphagia  CV:  No pain, palpitations, hypo/hypertension  Resp:  No dyspnea, cough, tachypnea, wheezing  GI:  No pain, No nausea, No vomiting, improving diarrhea, No constipation, No weight loss, No fever, No pruritis, No rectal bleeding, No melena, No dysphagia  :  No pain, bleeding, incontinence, nocturia  Muscle:  No pain, weakness  Neuro:  No weakness, tingling, memory problems  Psych:  No fatigue, insomnia, mood problems, depression  Endocrine:  No polyuria, polydypsia, cold/heat intolerance  Heme:  No petechiae, ecchymosis, easy bruisability  Skin:  No rash, tattoos, scars, edema      Vital Signs Last 24 Hrs  T(C): 36.8 (14 Nov 2018 05:28), Max: 36.8 (14 Nov 2018 05:28)  T(F): 98.2 (14 Nov 2018 05:28), Max: 98.2 (14 Nov 2018 05:28)  HR: 74 (14 Nov 2018 05:28) (74 - 94)  BP: 129/78 (14 Nov 2018 05:28) (117/84 - 131/82)  BP(mean): --  RR: 16 (14 Nov 2018 05:28) (16 - 16)  SpO2: 95% (14 Nov 2018 05:28) (95% - 99%)    PHYSICAL EXAM:    Constitutional: NAD lying in bed  HEENT: ncat  Neck: No LAD  Respiratory: CTA   Cardiovascular: S1 and S2, RRR  Gastrointestinal: soft nt nd incision sites c/d/i  Extremities: No peripheral edema  Vascular: 2+ peripheral pulses  Neurological: A/O x 3  Skin: No rashes      LABS:                        14.0   6.40  )-----------( 302      ( 13 Nov 2018 09:03 )             42.9     11-13    139  |  106  |  15  ----------------------------<  88  3.8   |  26  |  0.84    Ca    8.7      13 Nov 2018 09:03    TPro  7.0  /  Alb  3.0<L>  /  TBili  0.8  /  DBili  x   /  AST  20  /  ALT  26  /  AlkPhos  85  11-13          RADIOLOGY & ADDITIONAL TESTS:
INTERVAL HPI/OVERNIGHT EVENTS:  pt seen and examined, wife present  c/o loose brown stools, although consistency somewhat improving and w bloating  denies n/v/abd pain  afebrile overnight labs pending    MEDICATIONS  (STANDING):  aztreonam  IVPB      aztreonam  IVPB 2000 milliGRAM(s) IV Intermittent every 8 hours  lactated ringers. 1000 milliLiter(s) (50 mL/Hr) IV Continuous <Continuous>  metroNIDAZOLE  IVPB 500 milliGRAM(s) IV Intermittent every 8 hours    MEDICATIONS  (PRN):      Allergies    penicillin (Hives)    Intolerances        Review of Systems:    General:  No wt loss, fevers, chills, night sweats, fatigue   Eyes:  Good vision, no reported pain  ENT:  No sore throat, pain, runny nose, dysphagia  CV:  No pain, palpitations, hypo/hypertension  Resp:  No dyspnea, cough, tachypnea, wheezing  GI:  bloating No pain, No nausea, No vomiting, +diarrhea, No constipation, No weight loss, No fever, No pruritis, No rectal bleeding, No melena, No dysphagia  :  No pain, bleeding, incontinence, nocturia  Muscle:  No pain, weakness  Neuro:  No weakness, tingling, memory problems  Psych:  No fatigue, insomnia, mood problems, depression  Endocrine:  No polyuria, polydypsia, cold/heat intolerance  Heme:  No petechiae, ecchymosis, easy bruisability  Skin:  No rash, tattoos, scars, edema      Vital Signs Last 24 Hrs  T(C): 36.3 (13 Nov 2018 04:35), Max: 36.8 (12 Nov 2018 21:40)  T(F): 97.3 (13 Nov 2018 04:35), Max: 98.2 (12 Nov 2018 21:40)  HR: 93 (13 Nov 2018 04:35) (84 - 93)  BP: 144/88 (13 Nov 2018 04:35) (121/80 - 144/88)  BP(mean): --  RR: 18 (13 Nov 2018 04:35) (16 - 18)  SpO2: 95% (13 Nov 2018 04:35) (95% - 98%)    PHYSICAL EXAM:    Constitutional: NAD lying in bed  HEENT: ncat  Neck: No LAD  Respiratory: CTA   Cardiovascular: S1 and S2, RRR  Gastrointestinal: soft nt nd incisions sites c/d/i  Extremities: No peripheral edema  Vascular: 2+ peripheral pulses  Neurological: A/O x 3  Skin: No rashes      LABS:                        16.6   21.57 )-----------( 442      ( 12 Nov 2018 07:06 )             50.3     11-12    139  |  108  |  14  ----------------------------<  91  4.7   |  24  |  0.90    Ca    8.3<L>      12 Nov 2018 10:56    TPro  8.8<H>  /  Alb  3.6  /  TBili  0.7  /  DBili  x   /  AST  26  /  ALT  34  /  AlkPhos  125<H>  11-12          RADIOLOGY & ADDITIONAL TESTS:
pt seen  feeling better  still with diarrhea, improving  -n-v  ICU Vital Signs Last 24 Hrs  T(C): 36.3 (13 Nov 2018 04:35), Max: 36.8 (12 Nov 2018 21:40)  T(F): 97.3 (13 Nov 2018 04:35), Max: 98.2 (12 Nov 2018 21:40)  HR: 93 (13 Nov 2018 04:35) (84 - 93)  BP: 144/88 (13 Nov 2018 04:35) (121/80 - 144/88)  BP(mean): --  ABP: --  ABP(mean): --  RR: 18 (13 Nov 2018 04:35) (16 - 18)  SpO2: 95% (13 Nov 2018 04:35) (95% - 97%)  gen-NAD  resp-clear  abd0-soft NT/Nd, incision c/d/i                          14.0   6.40  )-----------( 302      ( 13 Nov 2018 09:03 )             42.9     Cdif +

## 2018-11-14 NOTE — DISCHARGE NOTE ADULT - PATIENT PORTAL LINK FT
You can access the [facility] Patient Portal, offered by [facility], by registering with the following website: [email or web address]

## 2018-11-14 NOTE — DISCHARGE NOTE ADULT - NS AS ACTIVITY OBS
Return to Work/School allowed/Walking-Outdoors allowed/Stairs allowed/Showering allowed/Do not make important decisions/Do not drive or operate machinery/No Heavy lifting/straining

## 2018-11-14 NOTE — DISCHARGE NOTE ADULT - HOSPITAL COURSE
Pt is a 48 Y.O. M s/p lap kaylene 2 weeks ago presents with nausea/vomiting. Pt also reported multiple loose bowel movements- non-bloody in nature. Pt denies any abdominal pain and denies fever/chills. Pt's stool was sent for C. Diff and was found to be (+). Pt was seen by infectious disease and placed IV Flagyl and oral vanco. Pt improved clinically and began to have less frequent and more formed stool. Pt continued to tolerate regular diet and remained afebrile throughout hospital stay. Overall stay uneventful and patient stable for discharge home and understands importance of completion of oral antibiotic course for treatment of C.diff.

## 2018-11-14 NOTE — DISCHARGE NOTE ADULT - MEDICATION SUMMARY - MEDICATIONS TO TAKE
I will START or STAY ON the medications listed below when I get home from the hospital:    vancomycin 125 mg oral capsule  -- 1 cap(s) by mouth every 6 hours  -- Indication: For Clostridium difficile infection    lactobacillus acidophilus oral capsule  --  by mouth daily for supplementation  -- Indication: For Probiotic

## 2018-11-14 NOTE — DISCHARGE NOTE ADULT - PLAN OF CARE
1) Complete course of antibiotics as directed  2) Call Dr. Pedersen/Dr. Dacosta's office if symptoms worsen/persist  3) Call Dr. Pedersen's office for an apppointment for follow up in on Wed 11/21/18 Recovery

## 2018-11-14 NOTE — DISCHARGE NOTE ADULT - CARE PROVIDER_API CALL
Devyn Pedersen (MD), Surgery  [address] [facility]  [address] [place] [address]  Phone: [phone]  Fax: [phone]

## 2018-11-17 LAB
CULTURE RESULTS: SIGNIFICANT CHANGE UP
CULTURE RESULTS: SIGNIFICANT CHANGE UP
SPECIMEN SOURCE: SIGNIFICANT CHANGE UP
SPECIMEN SOURCE: SIGNIFICANT CHANGE UP

## 2019-01-25 NOTE — ED ADULT NURSE NOTE - MUSCULOSKELETAL WDL
Received a fax requesting refill for Quinapril 10mg once daily.     Future Appointments   Date Time Provider Myla Gamez   2/22/2019  2:00 PM Forest Kline MD [facility]
Full range of motion of upper and lower extremities, no joint tenderness/swelling.

## 2019-07-24 NOTE — ED PROVIDER NOTE - NS_EDPROVIDERDISPOUSERTYPE_ED_A_ED
Arrived on the unit from dialysis AAOx4   
Off the unit in dialysis  
Attending Attestation (For Attendings USE Only)...

## 2019-11-29 ENCOUNTER — EMERGENCY (EMERGENCY)
Facility: HOSPITAL | Age: 49
LOS: 1 days | Discharge: HOME | End: 2019-11-29
Attending: EMERGENCY MEDICINE | Admitting: EMERGENCY MEDICINE
Payer: COMMERCIAL

## 2019-11-29 VITALS
RESPIRATION RATE: 18 BRPM | DIASTOLIC BLOOD PRESSURE: 86 MMHG | WEIGHT: 229.94 LBS | OXYGEN SATURATION: 97 % | HEART RATE: 91 BPM | SYSTOLIC BLOOD PRESSURE: 159 MMHG | TEMPERATURE: 98 F | HEIGHT: 68 IN

## 2019-11-29 VITALS
SYSTOLIC BLOOD PRESSURE: 160 MMHG | DIASTOLIC BLOOD PRESSURE: 90 MMHG | HEART RATE: 74 BPM | TEMPERATURE: 98 F | OXYGEN SATURATION: 100 % | RESPIRATION RATE: 14 BRPM

## 2019-11-29 DIAGNOSIS — Z90.49 ACQUIRED ABSENCE OF OTHER SPECIFIED PARTS OF DIGESTIVE TRACT: Chronic | ICD-10-CM

## 2019-11-29 LAB
ALBUMIN SERPL ELPH-MCNC: 3.9 G/DL — SIGNIFICANT CHANGE UP (ref 3.3–5)
ALP SERPL-CCNC: 111 U/L — SIGNIFICANT CHANGE UP (ref 40–120)
ALT FLD-CCNC: 19 U/L — SIGNIFICANT CHANGE UP (ref 12–78)
AMYLASE P1 CFR SERPL: 56 U/L — SIGNIFICANT CHANGE UP (ref 25–125)
ANION GAP SERPL CALC-SCNC: 4 MMOL/L — LOW (ref 5–17)
APPEARANCE UR: CLEAR — SIGNIFICANT CHANGE UP
AST SERPL-CCNC: 19 U/L — SIGNIFICANT CHANGE UP (ref 15–37)
BASOPHILS # BLD AUTO: 0.06 K/UL — SIGNIFICANT CHANGE UP (ref 0–0.2)
BASOPHILS NFR BLD AUTO: 0.4 % — SIGNIFICANT CHANGE UP (ref 0–2)
BILIRUB SERPL-MCNC: 0.8 MG/DL — SIGNIFICANT CHANGE UP (ref 0.2–1.2)
BILIRUB UR-MCNC: NEGATIVE — SIGNIFICANT CHANGE UP
BUN SERPL-MCNC: 18 MG/DL — SIGNIFICANT CHANGE UP (ref 7–23)
CALCIUM SERPL-MCNC: 9.5 MG/DL — SIGNIFICANT CHANGE UP (ref 8.5–10.1)
CHLORIDE SERPL-SCNC: 105 MMOL/L — SIGNIFICANT CHANGE UP (ref 96–108)
CO2 SERPL-SCNC: 29 MMOL/L — SIGNIFICANT CHANGE UP (ref 22–31)
COLOR SPEC: YELLOW — SIGNIFICANT CHANGE UP
CREAT SERPL-MCNC: 1 MG/DL — SIGNIFICANT CHANGE UP (ref 0.5–1.3)
DIFF PNL FLD: NEGATIVE — SIGNIFICANT CHANGE UP
EOSINOPHIL # BLD AUTO: 0.18 K/UL — SIGNIFICANT CHANGE UP (ref 0–0.5)
EOSINOPHIL NFR BLD AUTO: 1.3 % — SIGNIFICANT CHANGE UP (ref 0–6)
GLUCOSE SERPL-MCNC: 101 MG/DL — HIGH (ref 70–99)
GLUCOSE UR QL: NEGATIVE — SIGNIFICANT CHANGE UP
HCT VFR BLD CALC: 51.1 % — HIGH (ref 39–50)
HGB BLD-MCNC: 17.2 G/DL — HIGH (ref 13–17)
IMM GRANULOCYTES NFR BLD AUTO: 0.6 % — SIGNIFICANT CHANGE UP (ref 0–1.5)
KETONES UR-MCNC: NEGATIVE — SIGNIFICANT CHANGE UP
LEUKOCYTE ESTERASE UR-ACNC: NEGATIVE — SIGNIFICANT CHANGE UP
LIDOCAIN IGE QN: 157 U/L — SIGNIFICANT CHANGE UP (ref 73–393)
LYMPHOCYTES # BLD AUTO: 1.73 K/UL — SIGNIFICANT CHANGE UP (ref 1–3.3)
LYMPHOCYTES # BLD AUTO: 12.1 % — LOW (ref 13–44)
MCHC RBC-ENTMCNC: 27.7 PG — SIGNIFICANT CHANGE UP (ref 27–34)
MCHC RBC-ENTMCNC: 33.7 GM/DL — SIGNIFICANT CHANGE UP (ref 32–36)
MCV RBC AUTO: 82.4 FL — SIGNIFICANT CHANGE UP (ref 80–100)
MONOCYTES # BLD AUTO: 0.64 K/UL — SIGNIFICANT CHANGE UP (ref 0–0.9)
MONOCYTES NFR BLD AUTO: 4.5 % — SIGNIFICANT CHANGE UP (ref 2–14)
NEUTROPHILS # BLD AUTO: 11.59 K/UL — HIGH (ref 1.8–7.4)
NEUTROPHILS NFR BLD AUTO: 81.1 % — HIGH (ref 43–77)
NITRITE UR-MCNC: NEGATIVE — SIGNIFICANT CHANGE UP
NRBC # BLD: 0 /100 WBCS — SIGNIFICANT CHANGE UP (ref 0–0)
PH UR: 6.5 — SIGNIFICANT CHANGE UP (ref 5–8)
PLATELET # BLD AUTO: 237 K/UL — SIGNIFICANT CHANGE UP (ref 150–400)
POTASSIUM SERPL-MCNC: 5 MMOL/L — SIGNIFICANT CHANGE UP (ref 3.5–5.3)
POTASSIUM SERPL-SCNC: 5 MMOL/L — SIGNIFICANT CHANGE UP (ref 3.5–5.3)
PROT SERPL-MCNC: 7.9 G/DL — SIGNIFICANT CHANGE UP (ref 6–8.3)
PROT UR-MCNC: NEGATIVE — SIGNIFICANT CHANGE UP
RBC # BLD: 6.2 M/UL — HIGH (ref 4.2–5.8)
RBC # FLD: 12.9 % — SIGNIFICANT CHANGE UP (ref 10.3–14.5)
SODIUM SERPL-SCNC: 138 MMOL/L — SIGNIFICANT CHANGE UP (ref 135–145)
SP GR SPEC: 1.01 — SIGNIFICANT CHANGE UP (ref 1.01–1.02)
UROBILINOGEN FLD QL: NEGATIVE — SIGNIFICANT CHANGE UP
WBC # BLD: 14.29 K/UL — HIGH (ref 3.8–10.5)
WBC # FLD AUTO: 14.29 K/UL — HIGH (ref 3.8–10.5)

## 2019-11-29 PROCEDURE — 99284 EMERGENCY DEPT VISIT MOD MDM: CPT

## 2019-11-29 PROCEDURE — 99284 EMERGENCY DEPT VISIT MOD MDM: CPT | Mod: 25

## 2019-11-29 PROCEDURE — 83690 ASSAY OF LIPASE: CPT

## 2019-11-29 PROCEDURE — 96374 THER/PROPH/DIAG INJ IV PUSH: CPT

## 2019-11-29 PROCEDURE — 80053 COMPREHEN METABOLIC PANEL: CPT

## 2019-11-29 PROCEDURE — 85027 COMPLETE CBC AUTOMATED: CPT

## 2019-11-29 PROCEDURE — 82150 ASSAY OF AMYLASE: CPT

## 2019-11-29 PROCEDURE — 81003 URINALYSIS AUTO W/O SCOPE: CPT

## 2019-11-29 PROCEDURE — 96361 HYDRATE IV INFUSION ADD-ON: CPT

## 2019-11-29 PROCEDURE — 36415 COLL VENOUS BLD VENIPUNCTURE: CPT

## 2019-11-29 RX ORDER — ONDANSETRON 8 MG/1
1 TABLET, FILM COATED ORAL
Qty: 20 | Refills: 0
Start: 2019-11-29 | End: 2019-12-03

## 2019-11-29 RX ORDER — PANTOPRAZOLE SODIUM 20 MG/1
1 TABLET, DELAYED RELEASE ORAL
Qty: 0 | Refills: 0 | DISCHARGE

## 2019-11-29 RX ORDER — SODIUM CHLORIDE 9 MG/ML
1000 INJECTION INTRAMUSCULAR; INTRAVENOUS; SUBCUTANEOUS ONCE
Refills: 0 | Status: COMPLETED | OUTPATIENT
Start: 2019-11-29 | End: 2019-11-29

## 2019-11-29 RX ORDER — ONDANSETRON 8 MG/1
4 TABLET, FILM COATED ORAL ONCE
Refills: 0 | Status: COMPLETED | OUTPATIENT
Start: 2019-11-29 | End: 2019-11-29

## 2019-11-29 RX ADMIN — SODIUM CHLORIDE 1000 MILLILITER(S): 9 INJECTION INTRAMUSCULAR; INTRAVENOUS; SUBCUTANEOUS at 11:20

## 2019-11-29 RX ADMIN — SODIUM CHLORIDE 1000 MILLILITER(S): 9 INJECTION INTRAMUSCULAR; INTRAVENOUS; SUBCUTANEOUS at 11:30

## 2019-11-29 RX ADMIN — SODIUM CHLORIDE 1000 MILLILITER(S): 9 INJECTION INTRAMUSCULAR; INTRAVENOUS; SUBCUTANEOUS at 10:30

## 2019-11-29 RX ADMIN — SODIUM CHLORIDE 1000 MILLILITER(S): 9 INJECTION INTRAMUSCULAR; INTRAVENOUS; SUBCUTANEOUS at 12:20

## 2019-11-29 RX ADMIN — ONDANSETRON 4 MILLIGRAM(S): 8 TABLET, FILM COATED ORAL at 11:27

## 2019-11-29 NOTE — ED PROVIDER NOTE - OBJECTIVE STATEMENT
Nausea intermittently since October 2018 p cholecystecomy.  Pt barry by GI and surgeons that was unremarkable.  Nausea started this past Monday and vomiting started today x3.  No fever or pain.  pmd- [place].  GI [facility].  no other complaints.

## 2019-11-29 NOTE — ED PROVIDER NOTE - PATIENT PORTAL LINK FT
You can access the FollowMyHealth Patient Portal offered by [facility] by registering at the following website: [email or web address]. By joining [employer]’s FollowMyHealth portal, you will also be able to view your health information using other applications (apps) compatible with our system.

## 2019-11-29 NOTE — ED PROVIDER NOTE - NSFOLLOWUPINSTRUCTIONS_ED_ALL_ED_FT
1.  Take Zofran and protonix as prescribed.      Gastritis, Adult  Gastritis is inflammation of the stomach. There are two kinds of gastritis:  Acute gastritis. This kind develops suddenly.Chronic gastritis. This kind is much more common and lasts for a long time.Gastritis happens when the lining of the stomach becomes weak or gets damaged. Without treatment, gastritis can lead to stomach bleeding and ulcers.  What are the causes?  This condition may be caused by:  An infection.Drinking too much alcohol.Certain medicines. These include steroids, antibiotics, and some over-the-counter medicines, such as aspirin or ibuprofen.Having too much acid in the stomach.A disease of the intestines or stomach.Stress.An allergic reaction.Crohn's disease.Some cancer treatments (radiation).Sometimes the cause of this condition is not known.  What are the signs or symptoms?  Symptoms of this condition include:  Pain or a burning sensation in the upper abdomen.Nausea.Vomiting.An uncomfortable feeling of fullness after eating.Weight loss.Bad breath.Blood in your vomit or stools.In some cases, there are no symptoms.  How is this diagnosed?  This condition may be diagnosed with:  Your medical history and a description of your symptoms.A physical exam.Tests. These can include:  Blood tests.Stool tests.A test in which a thin, flexible instrument with a light and a camera is passed down the esophagus and into the stomach (upper endoscopy).A test in which a sample of tissue is taken for testing (biopsy).How is this treated?  This condition may be treated with medicines. The medicines that are used vary depending on the cause of the gastritis:  If the condition is caused by a bacterial infection, you may be given antibiotic medicines.If the condition is caused by too much acid in the stomach, you may be given medicines called H2 blockers, proton pump inhibitors, or antacids.Treatment may also involve stopping the use of certain medicines, such as aspirin, ibuprofen, or other NSAIDs.  Follow these instructions at home:  Medicines     Take over-the-counter and prescription medicines only as told by your health care provider.If you were prescribed an antibiotic medicine, take it as told by your health care provider. Do not stop taking the antibiotic even if you start to feel better.Eating and drinking        Eat small, frequent meals instead of large meals.Avoid foods and drinks that make your symptoms worse.Drink enough fluid to keep your urine pale yellow.Alcohol use     Do not drink alcohol if:  Your health care provider tells you not to drink.You are pregnant, may be pregnant, or are planning to become pregnant.If you drink alcohol:  Limit your use to:  0–1 drink a day for women.0–2 drinks a day for men.Be aware of how much alcohol is in your drink. In the [place] one drink equals one 12 oz bottle of beer (355 mL), one 5 oz glass of wine (148 mL), or one 1½ oz glass of hard liquor (44 mL).General instructions     Talk with your health care provider about ways to manage stress, such as getting regular exercise or practicing deep breathing, meditation, or yoga.Do not use any products that contain nicotine or tobacco, such as cigarettes and e-cigarettes. If you need help quitting, ask your health care provider.Keep all follow-up visits as told by your health care provider. This is important.Contact a health care provider if:  Your symptoms get worse.Your symptoms return after treatment.Get help right away if:  You vomit blood or material that looks like coffee grounds.You have black or dark red stools.You are unable to keep fluids down.Your abdominal pain gets worse.You have a fever.You do not feel better after one week.Summary  Gastritis is inflammation of the lining of the stomach that can occur suddenly (acute) or develop slowly over time (chronic).This condition is diagnosed with a medical history, a physical exam, or tests.This condition may be treated with medicines to treat infection or medicines to reduce the amount of acid in your stomach.Follow your health care provider's instructions about taking medicines, making changes to your diet, and knowing when to call for help.This information is not intended to replace advice given to you by your health care provider. Make sure you discuss any questions you have with your health care provider.    Document Released: 12/12/2002 Document Revised: 05/07/2019 Document Reviewed: 05/07/2019  [employer] Interactive Patient Education © 2019 [employer]

## 2019-11-29 NOTE — ED ADULT NURSE NOTE - OBJECTIVE STATEMENT
Received the patient in the Er. Patient is alert and oriented. Skin warm and dry. C/O nausea. As per patient he always get nausea and vomiting ever since his gall bladder removed  a year ago. Abdomen soft and nontender.

## 2019-11-29 NOTE — ED PROVIDER NOTE - CARE PROVIDER_API CALL
Alon Samuel)  Gastroenterology; Internal Medicine  [facility], [address] [place] [address]  Phone: [phone]  Fax: [phone]  Follow Up Time:

## 2019-11-29 NOTE — ED PROVIDER NOTE - CLINICAL SUMMARY MEDICAL DECISION MAKING FREE TEXT BOX
----- Message from Reilly Delgado sent at 12/8/2017  9:31 AM CST -----  Regarding: LAB   Patient has a lab appointment on 1/17/18.  No orders in epic.  Please enter orders prior to her January appointment.   nausea x 5 days and vomiting x 3 today, ivf, meds

## 2020-10-15 NOTE — ED PROVIDER NOTE - NS ED NOTE AC HIGH RISK COUNTRIES
[No

## 2021-06-02 NOTE — ED ADULT NURSE NOTE - NSIMPLEMENTINTERV_GEN_ALL_ED
Oriented - self; Oriented - place; Oriented - time
Implemented All Universal Safety Interventions:  [place] to call system. Call bell, personal items and telephone within reach. Instruct patient to call for assistance. Room bathroom lighting operational. Non-slip footwear when patient is off stretcher. Physically safe environment: no spills, clutter or unnecessary equipment. Stretcher in lowest position, wheels locked, appropriate side rails in place.

## 2021-07-22 NOTE — PRE-OP CHECKLIST - BLOOD AVAILABLE
- Get blood tests done when fasting (at least 8 hours, water and medications only). Lab is open Monday - Saturday. - Schedule mammogram and bone density scan (DEXA scan). Call [phone] to schedule. - Follow up with [address] [place] [address] for colonoscopy.   The no

## 2021-07-30 ENCOUNTER — EMERGENCY (EMERGENCY)
Facility: HOSPITAL | Age: 51
LOS: 1 days | Discharge: HOME | End: 2021-07-30
Attending: EMERGENCY MEDICINE | Admitting: EMERGENCY MEDICINE
Payer: COMMERCIAL

## 2021-07-30 VITALS
HEART RATE: 67 BPM | TEMPERATURE: 98 F | HEIGHT: 68 IN | OXYGEN SATURATION: 98 % | SYSTOLIC BLOOD PRESSURE: 165 MMHG | DIASTOLIC BLOOD PRESSURE: 91 MMHG | RESPIRATION RATE: 18 BRPM | WEIGHT: 184.97 LBS

## 2021-07-30 VITALS
RESPIRATION RATE: 18 BRPM | SYSTOLIC BLOOD PRESSURE: 180 MMHG | TEMPERATURE: 98 F | OXYGEN SATURATION: 97 % | DIASTOLIC BLOOD PRESSURE: 83 MMHG | HEART RATE: 70 BPM

## 2021-07-30 DIAGNOSIS — Z90.49 ACQUIRED ABSENCE OF OTHER SPECIFIED PARTS OF DIGESTIVE TRACT: Chronic | ICD-10-CM

## 2021-07-30 PROCEDURE — 99284 EMERGENCY DEPT VISIT MOD MDM: CPT

## 2021-07-30 PROCEDURE — 72100 X-RAY EXAM L-S SPINE 2/3 VWS: CPT

## 2021-07-30 PROCEDURE — 72100 X-RAY EXAM L-S SPINE 2/3 VWS: CPT | Mod: 26

## 2021-07-30 PROCEDURE — 99283 EMERGENCY DEPT VISIT LOW MDM: CPT | Mod: 25

## 2021-07-30 RX ORDER — LIDOCAINE 4 G/100G
1 CREAM TOPICAL
Qty: 30 | Refills: 0
Start: 2021-07-30 | End: 2021-08-28

## 2021-07-30 RX ORDER — METHOCARBAMOL 500 MG/1
500 TABLET, FILM COATED ORAL ONCE
Refills: 0 | Status: COMPLETED | OUTPATIENT
Start: 2021-07-30 | End: 2021-07-30

## 2021-07-30 RX ORDER — OXYCODONE AND ACETAMINOPHEN 5; 325 MG/1; MG/1
1 TABLET ORAL ONCE
Refills: 0 | Status: DISCONTINUED | OUTPATIENT
Start: 2021-07-30 | End: 2021-07-30

## 2021-07-30 RX ORDER — IBUPROFEN 200 MG
600 TABLET ORAL ONCE
Refills: 0 | Status: COMPLETED | OUTPATIENT
Start: 2021-07-30 | End: 2021-07-30

## 2021-07-30 RX ORDER — METHOCARBAMOL 500 MG/1
1 TABLET, FILM COATED ORAL
Qty: 15 | Refills: 0
Start: 2021-07-30 | End: 2021-08-03

## 2021-07-30 RX ADMIN — Medication 600 MILLIGRAM(S): at 22:37

## 2021-07-30 RX ADMIN — OXYCODONE AND ACETAMINOPHEN 1 TABLET(S): 5; 325 TABLET ORAL at 22:37

## 2021-07-30 RX ADMIN — METHOCARBAMOL 500 MILLIGRAM(S): 500 TABLET, FILM COATED ORAL at 22:37
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
## 2021-07-30 NOTE — ED ADULT NURSE NOTE - CHPI ED NUR SYMPTOMS NEG
no bruising/no crying/no difficulty bearing weight/no disorientation/no dizziness/no fussiness/no laceration/no loss of consciousness/no neck tenderness/no sleeping issues

## 2021-07-30 NOTE — ED PROVIDER NOTE - PATIENT PORTAL LINK FT
You can access the FollowMyHealth Patient Portal offered by [facility] by registering at the following website: [email or web address]. By joining [employer]’s FollowMyHealth portal, you will also be able to view your health information using other applications (apps) compatible with our system.

## 2021-07-30 NOTE — ED ADULT NURSE NOTE - CAS DISCH TRANSFER METHOD
Patient Education     Coping with Concussion  Concussion is also known as mild traumatic brain injury (MTBI). It is often caused by a blow to the head, or a fall. You may have been unconscious for a few seconds or minutes after the injury. Or maybe you were dazed, confused, or “saw stars.” After this, you thought you were OK. Now, weeks or months later, you’re having symptoms that may be caused by a concussion. The good news is that, in most people,  these symptoms will likely go away on their own. Most people with a concussion recover fully, with no need for treatment.     A cold compress can help relieve a headache.    What is a concussion?  A concussion is a mild form of brain injury. In some cases, the effects of a concussion go away within days of the injury. In others, symptoms may continue for a few months. Fortunately, a concussion is temporary. Even when symptoms stay for months, they do go away over time. If they don't, or if your symptoms are worse, contact your healthcare provider.  Symptoms of a concussion  You may have noticed some of these symptoms:  · Headaches  · Irritability and other changes in behavior  · Problems remembering or concentrating  · Dizziness or lack of coordination  · Fatigue  · Problems sleeping  · Sensitivity to light and sound  · Vision changes  NOTE: If you have severe symptoms or trouble functioning, talk with your healthcare provider right away. If you had a more serious head injury than a concussion, you likely need treatment. Be sure to see your healthcare provider for an evaluation.  What you can do  Since the effects of a concussion go away over time, there isn’t a lot you need to do. Be assured that this problem is temporary. You’ll likely have a full recovery. In the meantime, talk with your healthcare provider about ways to relieve any symptoms that are bothering you. These tips may help:  · Don't return to sports or any activity that could cause you to hit your head  until all symptoms are gone and you have been cleared by your doctor. A second head injury before fully recovering from the first one can lead to serious brain injury.  · Return to normal activities of daily living and normal social interaction is encouraged to speed recovery.  · Stress can make symptoms worse. Help calm yourself by resting in a quiet place and imagining a peaceful scene. Relax your muscles by soaking in a hot bath or taking a hot shower.  · Take over-the-counter  acetaminophen to relieve headache pain. Take them as directed on the package. Don't take ibuprofen or aspirin after a head injury.  · If you become dizzy, sit or lie down in a safe place until the sensation passes. Don’t drive when you feel dizzy or disoriented.  · If you’re having trouble sleeping, try to keep a regular sleep schedule. Go to bed and get up at the same time each day. Avoid or limit caffeine and nicotine. Also don't drink alcohol. It may help you sleep at first, but your sleep will not be restful.  · Give yourself time to heal. Your recovery will take some time. When you have symptoms, remember that you won’t feel this way forever. In time the symptoms will go away and you’ll be back to yourself.  If you’re not feeling better  The effects of a concussion often go away in 7 to 10 days and the vast majority of people who have had a concussion have recovered after 3 months. If you’re not feeling better as time passes, there may be something else going on. If your symptoms don’t go away or you notice new ones, talk with your healthcare provider. He or she can help you get the treatment you need.  [employer] last reviewed this educational content on 1/1/2018 © 2000-2020 [employer]. [address] [place] [address], [place] [address]. All rights reserved. This information is not intended as a substitute for professional medical care. Always follow your healthcare professional's instructions.            Private car

## 2021-07-30 NOTE — ED PROVIDER NOTE - NSFOLLOWUPINSTRUCTIONS_ED_ALL_ED_FT
Low Back Strain    WHAT YOU NEED TO KNOW:    Low back strain is an injury to your lower back muscles or tendons. Tendons are strong tissues that connect muscles to bones. The lower back supports most of your body weight and helps you move, twist, and bend.    DISCHARGE INSTRUCTIONS:    Seek care immediately if:   •You hear or feel a pop in your lower back.      •You have increased swelling or pain in your lower back.      •You have trouble moving your legs.      •Your legs are numb.      Contact your healthcare provider if:   •You have a fever.      •Your pain does not go away, even after treatment.      •You have questions or concerns about your condition or care.      Medicines: The following medicines may be ordered by your healthcare provider:   •Acetaminophen decreases pain and fever. It is available without a doctor's order. Ask how much to take and how often to take it. Follow directions. Acetaminophen can cause liver damage if not taken correctly.      •NSAIDs, such as ibuprofen, help decrease swelling, pain, and fever. This medicine is available with or without a doctor's order. NSAIDs can cause stomach bleeding or kidney problems in certain people. If you take blood thinner medicine, always ask your healthcare provider if NSAIDs are safe for you. Always read the medicine label and follow directions.      •Muscle relaxers help decrease pain and muscle spasms.      •Prescription pain medicine may be given. Ask how to take this medicine safely.      •Take your medicine as directed. Contact your healthcare provider if you think your medicine is not helping or if you have side effects. Tell him or her if you are allergic to any medicine. Keep a list of the medicines, vitamins, and herbs you take. Include the amounts, and when and why you take them. Bring the list or the pill bottles to follow-up visits. Carry your medicine list with you in case of an emergency.      Self-care:   •Rest as directed. You may need to rest in bed for a period of time after your injury. Do not lift heavy objects.       •Apply ice on your back for 15 to 20 minutes every hour or as directed. Use an ice pack, or put crushed ice in a plastic bag. Cover it with a towel. Ice helps prevent tissue damage and decreases swelling and pain.      •Apply heat on your lower back for 20 to 30 minutes every 2 hours for as many days as directed. Heat helps decrease pain and muscle spasms.      •Slowly start to increase your activity as the pain decreases, or as directed.      Prevent another low back strain:   •Use correct body movements. ?Bend at the hips and knees when you  objects. Do not bend from the waist. Use your leg muscles as you lift the load. Do not use your back. Keep the object close to your chest as you lift it. Try not to twist or lift anything above your waist.      ?Change your position often when you stand for long periods of time. Rest one foot on a small box or footrest, and then switch to the other foot often.      ?Try not to sit for long periods of time. When you do, sit in a straight-backed chair with your feet flat on the floor.      ?Never reach, pull, or push while you are sitting.      •Warm up before you exercise. Do exercises that strengthen your back muscles. Ask your healthcare provider about the best exercise plan for you.      •Maintain a healthy weight. Ask your healthcare provider how much you should weigh. Ask him to help you create a weight loss plan if you are overweight.       Follow up with your healthcare provider as directed: Write down your questions so you remember to ask them during your visits.
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
## 2023-08-16 NOTE — H&P ADULT - PSH
Health Maintenance Due   Topic Date Due   • Annual Physical (ages 3-18)  08/11/2023       Patient is due for the topics as listed above and wishes to proceed with them.    No significant past surgical history

## 2024-05-13 NOTE — PRE-OP CHECKLIST - AS BP NONINV METHOD
----- Message from Dillan Freire DO sent at 5/13/2024  8:38 AM EDT -----  Please let patient know that her    X-ray report describes some evidence of a possible right rib 11 fracture, which is healing. Recommend follow-up with Dr. Whitehead; could consider a medication to increase bone density.  
Result Communication    Resulted Orders   XR ribs right 2 views w chest pa or ap    Narrative    Interpreted By:  Brisa Cordova,   STUDY:  XR RIBS RIGHT 2 VIEWS WITH CHEST PA OR AP;      INDICATION:  Signs/Symptoms:pain.      COMPARISON:  Chest radiograph 09/21/2023.      ACCESSION NUMBER(S):  [id number]      ORDERING CLINICIAN:  ADRIANNE LENZ      FINDINGS:  The cardiac silhouette size is within normal limits.  There is no focal consolidation, edema or pneumothorax. Previously  noted bilateral lower lobe pulmonary consolidations have resolved. No  sizeable pleural effusion. No acute osseous abnormality.  Subtle sclerosis of the costochondral junction of the right 11th rib  suggesting a healing rib fracture.        Impression    1. Subtle sclerosis of the costochondral junction of the right 11th  rib suggesting a healing rib fracture.  2. No acute cardiopulmonary process.      Signed by: Brisa Cordova 5/13/2024 7:16 AM  Dictation workstation:   [id number]       10:19 AM      Results were successfully communicated with the patient and they acknowledged their understanding. Pt states nothing has helped in the past, she is going out of town so everything would have to wait anyways    
electronic

## 2024-05-26 NOTE — ED PROVIDER NOTE - CPE EDP CARDIAC NORM
normal...
You can access the FollowMyHealth Patient Portal offered by [facility] by registering at the following website: [email or web address]. By joining [employer]’s FollowMyHealth portal, you will also be able to view your health information using other applications (apps) compatible with our system.